# Patient Record
Sex: MALE | Race: WHITE | NOT HISPANIC OR LATINO | URBAN - METROPOLITAN AREA
[De-identification: names, ages, dates, MRNs, and addresses within clinical notes are randomized per-mention and may not be internally consistent; named-entity substitution may affect disease eponyms.]

---

## 2018-03-03 ENCOUNTER — INPATIENT (INPATIENT)
Facility: HOSPITAL | Age: 78
LOS: 5 days | Discharge: HOME | DRG: 220 | End: 2018-03-09
Attending: THORACIC SURGERY (CARDIOTHORACIC VASCULAR SURGERY) | Admitting: THORACIC SURGERY (CARDIOTHORACIC VASCULAR SURGERY)
Payer: MEDICARE

## 2018-03-03 ENCOUNTER — TRANSCRIPTION ENCOUNTER (OUTPATIENT)
Age: 78
End: 2018-03-03

## 2018-03-03 VITALS
SYSTOLIC BLOOD PRESSURE: 183 MMHG | TEMPERATURE: 98 F | WEIGHT: 154.98 LBS | HEIGHT: 67 IN | DIASTOLIC BLOOD PRESSURE: 105 MMHG | OXYGEN SATURATION: 99 % | HEART RATE: 83 BPM | RESPIRATION RATE: 18 BRPM

## 2018-03-03 DIAGNOSIS — I71.00 DISSECTION OF UNSPECIFIED SITE OF AORTA: ICD-10-CM

## 2018-03-03 DIAGNOSIS — I71.4 ABDOMINAL AORTIC ANEURYSM, WITHOUT RUPTURE: ICD-10-CM

## 2018-03-03 LAB
ALBUMIN SERPL ELPH-MCNC: 2.7 G/DL — LOW (ref 3.3–5)
ALBUMIN SERPL ELPH-MCNC: 3.4 G/DL — SIGNIFICANT CHANGE UP (ref 3.3–5)
ALP SERPL-CCNC: 42 U/L — SIGNIFICANT CHANGE UP (ref 40–120)
ALP SERPL-CCNC: 57 U/L — SIGNIFICANT CHANGE UP (ref 40–120)
ALT FLD-CCNC: 12 U/L RC — SIGNIFICANT CHANGE UP (ref 10–45)
ALT FLD-CCNC: 9 U/L RC — LOW (ref 10–45)
ANION GAP SERPL CALC-SCNC: 13 MMOL/L — SIGNIFICANT CHANGE UP (ref 5–17)
ANION GAP SERPL CALC-SCNC: 13 MMOL/L — SIGNIFICANT CHANGE UP (ref 5–17)
APTT BLD: 25.5 SEC — LOW (ref 27.5–37.4)
APTT BLD: 26.3 SEC — LOW (ref 27.5–37.4)
AST SERPL-CCNC: 15 U/L — SIGNIFICANT CHANGE UP (ref 10–40)
AST SERPL-CCNC: 17 U/L — SIGNIFICANT CHANGE UP (ref 10–40)
BILIRUB SERPL-MCNC: 0.4 MG/DL — SIGNIFICANT CHANGE UP (ref 0.2–1.2)
BILIRUB SERPL-MCNC: 0.6 MG/DL — SIGNIFICANT CHANGE UP (ref 0.2–1.2)
BLD GP AB SCN SERPL QL: NEGATIVE — SIGNIFICANT CHANGE UP
BUN SERPL-MCNC: 22 MG/DL — SIGNIFICANT CHANGE UP (ref 7–23)
BUN SERPL-MCNC: 23 MG/DL — SIGNIFICANT CHANGE UP (ref 7–23)
CALCIUM SERPL-MCNC: 7.5 MG/DL — LOW (ref 8.4–10.5)
CALCIUM SERPL-MCNC: 8.6 MG/DL — SIGNIFICANT CHANGE UP (ref 8.4–10.5)
CHLORIDE SERPL-SCNC: 102 MMOL/L — SIGNIFICANT CHANGE UP (ref 96–108)
CHLORIDE SERPL-SCNC: 103 MMOL/L — SIGNIFICANT CHANGE UP (ref 96–108)
CO2 SERPL-SCNC: 22 MMOL/L — SIGNIFICANT CHANGE UP (ref 22–31)
CO2 SERPL-SCNC: 24 MMOL/L — SIGNIFICANT CHANGE UP (ref 22–31)
CREAT SERPL-MCNC: 0.99 MG/DL — SIGNIFICANT CHANGE UP (ref 0.5–1.3)
CREAT SERPL-MCNC: 1.03 MG/DL — SIGNIFICANT CHANGE UP (ref 0.5–1.3)
GAS PNL BLDA: SIGNIFICANT CHANGE UP
GLUCOSE SERPL-MCNC: 113 MG/DL — HIGH (ref 70–99)
GLUCOSE SERPL-MCNC: 130 MG/DL — HIGH (ref 70–99)
HBA1C BLD-MCNC: 5.3 % — SIGNIFICANT CHANGE UP (ref 4–5.6)
HCT VFR BLD CALC: 29.3 % — LOW (ref 39–50)
HCT VFR BLD CALC: 32.7 % — LOW (ref 39–50)
HGB BLD-MCNC: 11.1 G/DL — LOW (ref 13–17)
HGB BLD-MCNC: 9.8 G/DL — LOW (ref 13–17)
INR BLD: 1.02 RATIO — SIGNIFICANT CHANGE UP (ref 0.88–1.16)
INR BLD: 1.11 RATIO — SIGNIFICANT CHANGE UP (ref 0.88–1.16)
MCHC RBC-ENTMCNC: 30.7 PG — SIGNIFICANT CHANGE UP (ref 27–34)
MCHC RBC-ENTMCNC: 31.4 PG — SIGNIFICANT CHANGE UP (ref 27–34)
MCHC RBC-ENTMCNC: 33.3 GM/DL — SIGNIFICANT CHANGE UP (ref 32–36)
MCHC RBC-ENTMCNC: 33.9 GM/DL — SIGNIFICANT CHANGE UP (ref 32–36)
MCV RBC AUTO: 92.3 FL — SIGNIFICANT CHANGE UP (ref 80–100)
MCV RBC AUTO: 92.7 FL — SIGNIFICANT CHANGE UP (ref 80–100)
PLATELET # BLD AUTO: 128 K/UL — LOW (ref 150–400)
PLATELET # BLD AUTO: 185 K/UL — SIGNIFICANT CHANGE UP (ref 150–400)
POTASSIUM SERPL-MCNC: 3.8 MMOL/L — SIGNIFICANT CHANGE UP (ref 3.5–5.3)
POTASSIUM SERPL-MCNC: 4.2 MMOL/L — SIGNIFICANT CHANGE UP (ref 3.5–5.3)
POTASSIUM SERPL-SCNC: 3.8 MMOL/L — SIGNIFICANT CHANGE UP (ref 3.5–5.3)
POTASSIUM SERPL-SCNC: 4.2 MMOL/L — SIGNIFICANT CHANGE UP (ref 3.5–5.3)
PROT SERPL-MCNC: 5 G/DL — LOW (ref 6–8.3)
PROT SERPL-MCNC: 6.5 G/DL — SIGNIFICANT CHANGE UP (ref 6–8.3)
PROTHROM AB SERPL-ACNC: 11 SEC — SIGNIFICANT CHANGE UP (ref 9.8–12.7)
PROTHROM AB SERPL-ACNC: 12 SEC — SIGNIFICANT CHANGE UP (ref 9.8–12.7)
RBC # BLD: 3.18 M/UL — LOW (ref 4.2–5.8)
RBC # BLD: 3.53 M/UL — LOW (ref 4.2–5.8)
RBC # FLD: 12.6 % — SIGNIFICANT CHANGE UP (ref 10.3–14.5)
RBC # FLD: 12.6 % — SIGNIFICANT CHANGE UP (ref 10.3–14.5)
RH IG SCN BLD-IMP: NEGATIVE — SIGNIFICANT CHANGE UP
SODIUM SERPL-SCNC: 137 MMOL/L — SIGNIFICANT CHANGE UP (ref 135–145)
SODIUM SERPL-SCNC: 140 MMOL/L — SIGNIFICANT CHANGE UP (ref 135–145)
T3 SERPL-MCNC: 86 NG/DL — SIGNIFICANT CHANGE UP (ref 80–200)
T4 AB SER-ACNC: 5.1 UG/DL — SIGNIFICANT CHANGE UP (ref 4.6–12)
TSH SERPL-MCNC: 2.44 UIU/ML — SIGNIFICANT CHANGE UP (ref 0.27–4.2)
WBC # BLD: 7.2 K/UL — SIGNIFICANT CHANGE UP (ref 3.8–10.5)
WBC # BLD: 8.2 K/UL — SIGNIFICANT CHANGE UP (ref 3.8–10.5)
WBC # FLD AUTO: 7.2 K/UL — SIGNIFICANT CHANGE UP (ref 3.8–10.5)
WBC # FLD AUTO: 8.2 K/UL — SIGNIFICANT CHANGE UP (ref 3.8–10.5)

## 2018-03-03 PROCEDURE — 75956: CPT | Mod: 26

## 2018-03-03 PROCEDURE — 71045 X-RAY EXAM CHEST 1 VIEW: CPT | Mod: 26

## 2018-03-03 PROCEDURE — 33880 EVASC RPR TA NDGFT COV LSA: CPT | Mod: 62

## 2018-03-03 PROCEDURE — 71275 CT ANGIOGRAPHY CHEST: CPT | Mod: 26

## 2018-03-03 PROCEDURE — 33880 EVASC RPR TA NDGFT COV LSA: CPT | Mod: 62,GC

## 2018-03-03 PROCEDURE — 75630 X-RAY AORTA LEG ARTERIES: CPT | Mod: 26

## 2018-03-03 PROCEDURE — 93010 ELECTROCARDIOGRAM REPORT: CPT

## 2018-03-03 PROCEDURE — 74174 CTA ABD&PLVS W/CONTRAST: CPT | Mod: 26

## 2018-03-03 RX ORDER — FENTANYL CITRATE 50 UG/ML
25 INJECTION INTRAVENOUS ONCE
Qty: 0 | Refills: 0 | Status: DISCONTINUED | OUTPATIENT
Start: 2018-03-03 | End: 2018-03-03

## 2018-03-03 RX ORDER — POTASSIUM CHLORIDE 20 MEQ
20 PACKET (EA) ORAL ONCE
Qty: 0 | Refills: 0 | Status: COMPLETED | OUTPATIENT
Start: 2018-03-03 | End: 2018-03-03

## 2018-03-03 RX ORDER — HYDRALAZINE HCL 50 MG
10 TABLET ORAL ONCE
Qty: 0 | Refills: 0 | Status: COMPLETED | OUTPATIENT
Start: 2018-03-03 | End: 2018-03-03

## 2018-03-03 RX ORDER — POTASSIUM CHLORIDE 20 MEQ
10 PACKET (EA) ORAL ONCE
Qty: 0 | Refills: 0 | Status: COMPLETED | OUTPATIENT
Start: 2018-03-03 | End: 2018-03-03

## 2018-03-03 RX ORDER — ACETAMINOPHEN 500 MG
1000 TABLET ORAL ONCE
Qty: 0 | Refills: 0 | Status: COMPLETED | OUTPATIENT
Start: 2018-03-03 | End: 2018-03-03

## 2018-03-03 RX ORDER — CEFUROXIME AXETIL 250 MG
1500 TABLET ORAL EVERY 8 HOURS
Qty: 0 | Refills: 0 | Status: COMPLETED | OUTPATIENT
Start: 2018-03-03 | End: 2018-03-04

## 2018-03-03 RX ORDER — POTASSIUM CHLORIDE 20 MEQ
10 PACKET (EA) ORAL
Qty: 0 | Refills: 0 | Status: COMPLETED | OUTPATIENT
Start: 2018-03-03 | End: 2018-03-03

## 2018-03-03 RX ORDER — PANTOPRAZOLE SODIUM 20 MG/1
40 TABLET, DELAYED RELEASE ORAL DAILY
Qty: 0 | Refills: 0 | Status: DISCONTINUED | OUTPATIENT
Start: 2018-03-03 | End: 2018-03-04

## 2018-03-03 RX ORDER — ESMOLOL HCL 100MG/10ML
50 VIAL (ML) INTRAVENOUS
Qty: 2500 | Refills: 0 | Status: DISCONTINUED | OUTPATIENT
Start: 2018-03-03 | End: 2018-03-04

## 2018-03-03 RX ORDER — ALBUMIN HUMAN 25 %
250 VIAL (ML) INTRAVENOUS ONCE
Qty: 0 | Refills: 0 | Status: COMPLETED | OUTPATIENT
Start: 2018-03-03 | End: 2018-03-03

## 2018-03-03 RX ORDER — NICARDIPINE HYDROCHLORIDE 30 MG/1
3 CAPSULE, EXTENDED RELEASE ORAL
Qty: 40 | Refills: 0 | Status: DISCONTINUED | OUTPATIENT
Start: 2018-03-03 | End: 2018-03-04

## 2018-03-03 RX ORDER — LABETALOL HCL 100 MG
1 TABLET ORAL
Qty: 200 | Refills: 0 | Status: DISCONTINUED | OUTPATIENT
Start: 2018-03-03 | End: 2018-03-03

## 2018-03-03 RX ORDER — TAMSULOSIN HYDROCHLORIDE 0.4 MG/1
0.4 CAPSULE ORAL AT BEDTIME
Qty: 0 | Refills: 0 | Status: DISCONTINUED | OUTPATIENT
Start: 2018-03-03 | End: 2018-03-09

## 2018-03-03 RX ORDER — LABETALOL HCL 100 MG
20 TABLET ORAL ONCE
Qty: 0 | Refills: 0 | Status: COMPLETED | OUTPATIENT
Start: 2018-03-03 | End: 2018-03-03

## 2018-03-03 RX ORDER — MORPHINE SULFATE 50 MG/1
4 CAPSULE, EXTENDED RELEASE ORAL ONCE
Qty: 0 | Refills: 0 | Status: DISCONTINUED | OUTPATIENT
Start: 2018-03-03 | End: 2018-03-03

## 2018-03-03 RX ORDER — FENTANYL CITRATE 50 UG/ML
50 INJECTION INTRAVENOUS ONCE
Qty: 0 | Refills: 0 | Status: DISCONTINUED | OUTPATIENT
Start: 2018-03-03 | End: 2018-03-03

## 2018-03-03 RX ORDER — HYDROMORPHONE HYDROCHLORIDE 2 MG/ML
0.5 INJECTION INTRAMUSCULAR; INTRAVENOUS; SUBCUTANEOUS ONCE
Qty: 0 | Refills: 0 | Status: DISCONTINUED | OUTPATIENT
Start: 2018-03-03 | End: 2018-03-03

## 2018-03-03 RX ORDER — METOPROLOL TARTRATE 50 MG
25 TABLET ORAL EVERY 12 HOURS
Qty: 0 | Refills: 0 | Status: DISCONTINUED | OUTPATIENT
Start: 2018-03-04 | End: 2018-03-04

## 2018-03-03 RX ORDER — PROPOFOL 10 MG/ML
40 INJECTION, EMULSION INTRAVENOUS
Qty: 1000 | Refills: 0 | Status: DISCONTINUED | OUTPATIENT
Start: 2018-03-03 | End: 2018-03-03

## 2018-03-03 RX ADMIN — Medication 100 MILLIGRAM(S): at 18:06

## 2018-03-03 RX ADMIN — Medication 50 MILLIEQUIVALENT(S): at 18:06

## 2018-03-03 RX ADMIN — Medication 400 MILLIGRAM(S): at 16:50

## 2018-03-03 RX ADMIN — Medication 1000 MILLIGRAM(S): at 17:05

## 2018-03-03 RX ADMIN — Medication 50 MILLIEQUIVALENT(S): at 13:49

## 2018-03-03 RX ADMIN — HYDROMORPHONE HYDROCHLORIDE 0.5 MILLIGRAM(S): 2 INJECTION INTRAMUSCULAR; INTRAVENOUS; SUBCUTANEOUS at 13:50

## 2018-03-03 RX ADMIN — FENTANYL CITRATE 50 MICROGRAM(S): 50 INJECTION INTRAVENOUS at 19:30

## 2018-03-03 RX ADMIN — FENTANYL CITRATE 25 MICROGRAM(S): 50 INJECTION INTRAVENOUS at 17:05

## 2018-03-03 RX ADMIN — MORPHINE SULFATE 4 MILLIGRAM(S): 50 CAPSULE, EXTENDED RELEASE ORAL at 09:00

## 2018-03-03 RX ADMIN — Medication 50 MILLIEQUIVALENT(S): at 19:27

## 2018-03-03 RX ADMIN — Medication 20 MILLIEQUIVALENT(S): at 22:31

## 2018-03-03 RX ADMIN — Medication 10 MILLIGRAM(S): at 13:10

## 2018-03-03 RX ADMIN — PANTOPRAZOLE SODIUM 40 MILLIGRAM(S): 20 TABLET, DELAYED RELEASE ORAL at 20:58

## 2018-03-03 RX ADMIN — NICARDIPINE HYDROCHLORIDE 15 MG/HR: 30 CAPSULE, EXTENDED RELEASE ORAL at 13:51

## 2018-03-03 RX ADMIN — FENTANYL CITRATE 50 MICROGRAM(S): 50 INJECTION INTRAVENOUS at 19:15

## 2018-03-03 RX ADMIN — Medication 20 MILLIGRAM(S): at 08:45

## 2018-03-03 RX ADMIN — NICARDIPINE HYDROCHLORIDE 15 MG/HR: 30 CAPSULE, EXTENDED RELEASE ORAL at 21:17

## 2018-03-03 RX ADMIN — Medication 125 MILLILITER(S): at 13:00

## 2018-03-03 RX ADMIN — Medication 21.09 MICROGRAM(S)/KG/MIN: at 21:17

## 2018-03-03 RX ADMIN — Medication 21.09 MICROGRAM(S)/KG/MIN: at 13:51

## 2018-03-03 RX ADMIN — MORPHINE SULFATE 4 MILLIGRAM(S): 50 CAPSULE, EXTENDED RELEASE ORAL at 08:45

## 2018-03-03 RX ADMIN — FENTANYL CITRATE 25 MICROGRAM(S): 50 INJECTION INTRAVENOUS at 16:50

## 2018-03-03 RX ADMIN — HYDROMORPHONE HYDROCHLORIDE 0.5 MILLIGRAM(S): 2 INJECTION INTRAMUSCULAR; INTRAVENOUS; SUBCUTANEOUS at 14:05

## 2018-03-03 NOTE — H&P ADULT - ASSESSMENT
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

## 2018-03-03 NOTE — BRIEF OPERATIVE NOTE - PROCEDURE
<<-----Click on this checkbox to enter Procedure Thoracostomy for drainage of hemothorax  03/03/2018    Active  [facility]  Second stage thoracic endovascular aortic repair (TEVAR)  03/03/2018    Active  [facility]

## 2018-03-03 NOTE — H&P ADULT - NSHPPHYSICALEXAM_GEN_ALL_CORE
Neuro: A+O X3, no deficets  Gen: Well nourished, anxious  Pulm: CTA B  Cardiac: S1/S2 RRR no murmurs or gallops or rubs noted  Abd: Soft, NT, ND  Ext, + Pedal pulses and radial pulses B    Physical exam was abbreviated due to emergent CTA/Surgical intervention.

## 2018-03-03 NOTE — PROGRESS NOTE ADULT - SUBJECTIVE AND OBJECTIVE BOX
I was  asked by Dr Valdez to see this  patient  transferred from [facility] with hemothorax on the  left  side  and  a Type  B  dissection  We could not  obtain  consent  from  the patient  because  on true  emergency nature  Family is  told  regarding  the  nature of his  condition and  the procedure  Patient  was brought to OR 23    immediately after  the  CTA  Patient is placed  supine  and  right groin  cut down done  and left  femoral puncture   and  5 Albanian sheath placed  Right  side  11 Albanian  sheath placed  after  making  sure  we are in the  aorta with  wire  IVUS used  to confirm  position of true  lumen  Aortic  arch angio done  TEVAR done  using 40 36 167  and  a distal 36 161  used   extravasation n stopped   Celiac  axis  flow preserved  True  lumen flow preserved  false lumen flow   almost all caty  There is  still abdominal   false lumen filling  Subclavian left partially covred Dr Dominguez  and myself  did  the procedure Dr Dominguez placed  a left chest tube

## 2018-03-03 NOTE — AIRWAY REMOVAL NOTE  ADULT & PEDS - ARTIFICAL AIRWAY REMOVAL COMMENTS
Written order for extubation verified. The patient was identified by full name and birth date compared to the identification band. Present during the procedure was Mónica LAMB)

## 2018-03-03 NOTE — H&P ADULT - PROBLEM SELECTOR PLAN 1
Admit to CTU Dr Valdez, Emergent CTA Chest, Abd, Pelvis for further evaluation of aortic dissection, Vascular surgery consultation with Dr Salas, Labetalol for HTN control, Morphine as well for intractable chest pain. NPO for Emergent Surgical intervention.

## 2018-03-03 NOTE — H&P ADULT - HISTORY OF PRESENT ILLNESS
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 Dissection extending into abdominal aorta.   Transferred to [facility] for CT surgery management with Dr. Valdez

## 2018-03-04 LAB
ALBUMIN SERPL ELPH-MCNC: 3.2 G/DL — LOW (ref 3.3–5)
ALP SERPL-CCNC: 47 U/L — SIGNIFICANT CHANGE UP (ref 40–120)
ALT FLD-CCNC: 9 U/L RC — LOW (ref 10–45)
AMYLASE P1 CFR SERPL: 68 U/L — SIGNIFICANT CHANGE UP (ref 25–125)
ANION GAP SERPL CALC-SCNC: 12 MMOL/L — SIGNIFICANT CHANGE UP (ref 5–17)
APTT BLD: 26.2 SEC — LOW (ref 27.5–37.4)
AST SERPL-CCNC: 20 U/L — SIGNIFICANT CHANGE UP (ref 10–40)
BILIRUB SERPL-MCNC: 0.7 MG/DL — SIGNIFICANT CHANGE UP (ref 0.2–1.2)
BUN SERPL-MCNC: 21 MG/DL — SIGNIFICANT CHANGE UP (ref 7–23)
CALCIUM SERPL-MCNC: 7.8 MG/DL — LOW (ref 8.4–10.5)
CHLORIDE SERPL-SCNC: 104 MMOL/L — SIGNIFICANT CHANGE UP (ref 96–108)
CK MB BLD-MCNC: 1.1 % — SIGNIFICANT CHANGE UP (ref 0–3.5)
CK MB CFR SERPL CALC: 2.6 NG/ML — SIGNIFICANT CHANGE UP (ref 0–6.7)
CK SERPL-CCNC: 242 U/L — HIGH (ref 30–200)
CO2 SERPL-SCNC: 22 MMOL/L — SIGNIFICANT CHANGE UP (ref 22–31)
CREAT SERPL-MCNC: 1.02 MG/DL — SIGNIFICANT CHANGE UP (ref 0.5–1.3)
GAS PNL BLDA: SIGNIFICANT CHANGE UP
GLUCOSE SERPL-MCNC: 150 MG/DL — HIGH (ref 70–99)
HCT VFR BLD CALC: 31.4 % — LOW (ref 39–50)
HGB BLD-MCNC: 10.7 G/DL — LOW (ref 13–17)
INR BLD: 1.03 RATIO — SIGNIFICANT CHANGE UP (ref 0.88–1.16)
LIDOCAIN IGE QN: 28 U/L — SIGNIFICANT CHANGE UP (ref 7–60)
MCHC RBC-ENTMCNC: 31.5 PG — SIGNIFICANT CHANGE UP (ref 27–34)
MCHC RBC-ENTMCNC: 33.9 GM/DL — SIGNIFICANT CHANGE UP (ref 32–36)
MCV RBC AUTO: 92.7 FL — SIGNIFICANT CHANGE UP (ref 80–100)
PHOSPHATE SERPL-MCNC: 3.1 MG/DL — SIGNIFICANT CHANGE UP (ref 2.5–4.5)
PLATELET # BLD AUTO: 145 K/UL — LOW (ref 150–400)
POTASSIUM SERPL-MCNC: 4.3 MMOL/L — SIGNIFICANT CHANGE UP (ref 3.5–5.3)
POTASSIUM SERPL-SCNC: 4.3 MMOL/L — SIGNIFICANT CHANGE UP (ref 3.5–5.3)
PROT SERPL-MCNC: 6 G/DL — SIGNIFICANT CHANGE UP (ref 6–8.3)
PROTHROM AB SERPL-ACNC: 11.1 SEC — SIGNIFICANT CHANGE UP (ref 9.8–12.7)
RBC # BLD: 3.39 M/UL — LOW (ref 4.2–5.8)
RBC # FLD: 12.8 % — SIGNIFICANT CHANGE UP (ref 10.3–14.5)
SODIUM SERPL-SCNC: 138 MMOL/L — SIGNIFICANT CHANGE UP (ref 135–145)
TROPONIN T SERPL-MCNC: <0.01 NG/ML — SIGNIFICANT CHANGE UP (ref 0–0.06)
WBC # BLD: 11.2 K/UL — HIGH (ref 3.8–10.5)
WBC # FLD AUTO: 11.2 K/UL — HIGH (ref 3.8–10.5)

## 2018-03-04 PROCEDURE — 71045 X-RAY EXAM CHEST 1 VIEW: CPT | Mod: 26

## 2018-03-04 PROCEDURE — 99233 SBSQ HOSP IP/OBS HIGH 50: CPT

## 2018-03-04 PROCEDURE — 93010 ELECTROCARDIOGRAM REPORT: CPT

## 2018-03-04 RX ORDER — LABETALOL HCL 100 MG
10 TABLET ORAL ONCE
Qty: 0 | Refills: 0 | Status: COMPLETED | OUTPATIENT
Start: 2018-03-04 | End: 2018-03-04

## 2018-03-04 RX ORDER — FUROSEMIDE 40 MG
20 TABLET ORAL ONCE
Qty: 0 | Refills: 0 | Status: COMPLETED | OUTPATIENT
Start: 2018-03-04 | End: 2018-03-04

## 2018-03-04 RX ORDER — ENOXAPARIN SODIUM 100 MG/ML
40 INJECTION SUBCUTANEOUS DAILY
Qty: 0 | Refills: 0 | Status: DISCONTINUED | OUTPATIENT
Start: 2018-03-04 | End: 2018-03-09

## 2018-03-04 RX ORDER — OXYCODONE AND ACETAMINOPHEN 5; 325 MG/1; MG/1
1 TABLET ORAL EVERY 6 HOURS
Qty: 0 | Refills: 0 | Status: DISCONTINUED | OUTPATIENT
Start: 2018-03-04 | End: 2018-03-09

## 2018-03-04 RX ORDER — METOPROLOL TARTRATE 50 MG
5 TABLET ORAL ONCE
Qty: 0 | Refills: 0 | Status: COMPLETED | OUTPATIENT
Start: 2018-03-04 | End: 2018-03-04

## 2018-03-04 RX ORDER — FENTANYL CITRATE 50 UG/ML
25 INJECTION INTRAVENOUS ONCE
Qty: 0 | Refills: 0 | Status: DISCONTINUED | OUTPATIENT
Start: 2018-03-04 | End: 2018-03-04

## 2018-03-04 RX ORDER — IPRATROPIUM/ALBUTEROL SULFATE 18-103MCG
3 AEROSOL WITH ADAPTER (GRAM) INHALATION EVERY 6 HOURS
Qty: 0 | Refills: 0 | Status: DISCONTINUED | OUTPATIENT
Start: 2018-03-04 | End: 2018-03-04

## 2018-03-04 RX ORDER — ACETAMINOPHEN 500 MG
1000 TABLET ORAL ONCE
Qty: 0 | Refills: 0 | Status: COMPLETED | OUTPATIENT
Start: 2018-03-04 | End: 2018-03-04

## 2018-03-04 RX ORDER — AMLODIPINE BESYLATE 2.5 MG/1
10 TABLET ORAL DAILY
Qty: 0 | Refills: 0 | Status: DISCONTINUED | OUTPATIENT
Start: 2018-03-04 | End: 2018-03-05

## 2018-03-04 RX ORDER — METOPROLOL TARTRATE 50 MG
50 TABLET ORAL EVERY 8 HOURS
Qty: 0 | Refills: 0 | Status: DISCONTINUED | OUTPATIENT
Start: 2018-03-04 | End: 2018-03-05

## 2018-03-04 RX ORDER — METOPROLOL TARTRATE 50 MG
25 TABLET ORAL EVERY 8 HOURS
Qty: 0 | Refills: 0 | Status: DISCONTINUED | OUTPATIENT
Start: 2018-03-04 | End: 2018-03-04

## 2018-03-04 RX ORDER — OXYCODONE AND ACETAMINOPHEN 5; 325 MG/1; MG/1
2 TABLET ORAL EVERY 6 HOURS
Qty: 0 | Refills: 0 | Status: DISCONTINUED | OUTPATIENT
Start: 2018-03-04 | End: 2018-03-09

## 2018-03-04 RX ORDER — ALBUMIN HUMAN 25 %
250 VIAL (ML) INTRAVENOUS ONCE
Qty: 0 | Refills: 0 | Status: COMPLETED | OUTPATIENT
Start: 2018-03-04 | End: 2018-03-04

## 2018-03-04 RX ORDER — IPRATROPIUM/ALBUTEROL SULFATE 18-103MCG
3 AEROSOL WITH ADAPTER (GRAM) INHALATION EVERY 6 HOURS
Qty: 0 | Refills: 0 | Status: DISCONTINUED | OUTPATIENT
Start: 2018-03-04 | End: 2018-03-09

## 2018-03-04 RX ORDER — ASPIRIN/CALCIUM CARB/MAGNESIUM 324 MG
81 TABLET ORAL DAILY
Qty: 0 | Refills: 0 | Status: DISCONTINUED | OUTPATIENT
Start: 2018-03-04 | End: 2018-03-09

## 2018-03-04 RX ORDER — PANTOPRAZOLE SODIUM 20 MG/1
40 TABLET, DELAYED RELEASE ORAL
Qty: 0 | Refills: 0 | Status: DISCONTINUED | OUTPATIENT
Start: 2018-03-04 | End: 2018-03-09

## 2018-03-04 RX ADMIN — FENTANYL CITRATE 25 MICROGRAM(S): 50 INJECTION INTRAVENOUS at 05:45

## 2018-03-04 RX ADMIN — OXYCODONE AND ACETAMINOPHEN 2 TABLET(S): 5; 325 TABLET ORAL at 14:53

## 2018-03-04 RX ADMIN — Medication 5 MILLIGRAM(S): at 22:02

## 2018-03-04 RX ADMIN — FENTANYL CITRATE 25 MICROGRAM(S): 50 INJECTION INTRAVENOUS at 05:14

## 2018-03-04 RX ADMIN — Medication 20 MILLIGRAM(S): at 17:01

## 2018-03-04 RX ADMIN — Medication 20 MILLIGRAM(S): at 12:05

## 2018-03-04 RX ADMIN — Medication 1000 MILLIGRAM(S): at 06:50

## 2018-03-04 RX ADMIN — ENOXAPARIN SODIUM 40 MILLIGRAM(S): 100 INJECTION SUBCUTANEOUS at 12:05

## 2018-03-04 RX ADMIN — OXYCODONE AND ACETAMINOPHEN 2 TABLET(S): 5; 325 TABLET ORAL at 14:30

## 2018-03-04 RX ADMIN — TAMSULOSIN HYDROCHLORIDE 0.4 MILLIGRAM(S): 0.4 CAPSULE ORAL at 00:16

## 2018-03-04 RX ADMIN — FENTANYL CITRATE 25 MICROGRAM(S): 50 INJECTION INTRAVENOUS at 06:20

## 2018-03-04 RX ADMIN — TAMSULOSIN HYDROCHLORIDE 0.4 MILLIGRAM(S): 0.4 CAPSULE ORAL at 21:28

## 2018-03-04 RX ADMIN — PANTOPRAZOLE SODIUM 40 MILLIGRAM(S): 20 TABLET, DELAYED RELEASE ORAL at 08:10

## 2018-03-04 RX ADMIN — FENTANYL CITRATE 25 MICROGRAM(S): 50 INJECTION INTRAVENOUS at 06:35

## 2018-03-04 RX ADMIN — Medication 25 MILLIGRAM(S): at 05:14

## 2018-03-04 RX ADMIN — OXYCODONE AND ACETAMINOPHEN 2 TABLET(S): 5; 325 TABLET ORAL at 08:40

## 2018-03-04 RX ADMIN — Medication 400 MILLIGRAM(S): at 06:20

## 2018-03-04 RX ADMIN — FENTANYL CITRATE 25 MICROGRAM(S): 50 INJECTION INTRAVENOUS at 13:22

## 2018-03-04 RX ADMIN — FENTANYL CITRATE 25 MICROGRAM(S): 50 INJECTION INTRAVENOUS at 13:08

## 2018-03-04 RX ADMIN — Medication 10 MILLIGRAM(S): at 18:30

## 2018-03-04 RX ADMIN — Medication 50 MILLIGRAM(S): at 19:37

## 2018-03-04 RX ADMIN — OXYCODONE AND ACETAMINOPHEN 2 TABLET(S): 5; 325 TABLET ORAL at 08:10

## 2018-03-04 RX ADMIN — Medication 100 MILLIGRAM(S): at 02:00

## 2018-03-04 RX ADMIN — AMLODIPINE BESYLATE 10 MILLIGRAM(S): 2.5 TABLET ORAL at 22:04

## 2018-03-04 RX ADMIN — Medication 25 MILLIGRAM(S): at 13:08

## 2018-03-04 RX ADMIN — Medication 81 MILLIGRAM(S): at 14:29

## 2018-03-04 RX ADMIN — Medication 125 MILLILITER(S): at 03:19

## 2018-03-04 NOTE — PROVIDER CONTACT NOTE (OTHER) - ACTION/TREATMENT ORDERED:
MD WELLS AT BEDSIDE. MD WELLS STATES TO ONLY TAKE BLOOD PRESSURE ON RUE. DO NOT TAKE BLOOD PRESSURE ON LUE.

## 2018-03-04 NOTE — PROVIDER CONTACT NOTE (OTHER) - ASSESSMENT
EMERGENT TX FROM [facility] ON 3/3/18. S/P TEVAR ON 3/3/2018. MD WELLS AT BEDSIDE. REQUEST B/L BLOOD PRESSURE CUFF PRESSURES. LUE 77/61 (66). rue 96/55 (71). MD WELLS MADE AWARE IMMEDIATELY REGARDING BLOOD PRESSURE DIFFERENTIAL. MD WELLS AT BEDSIDE. MD WELLS STATES TO ONLY TAKE BLOOD PRESSURE ON RUE. DO NOT TAKE BLOOD PRESSURE ON LUE.

## 2018-03-04 NOTE — PROVIDER CONTACT NOTE (OTHER) - SITUATION
s/p TEVAR on 3/3/2018. MD WELLS AT BEDSIDE. REQUEST B/L BLOOD PRESSURE CUFF PRESSURES. PADMINI 77/61 (66). pippa 96/55 (71).

## 2018-03-04 NOTE — PROGRESS NOTE ADULT - SUBJECTIVE AND OBJECTIVE BOX
Operation: POD #1 s/p TEVAR  77 year old male, resting comfortably in bed, no acute distress    Vital Signs Last 24 Hrs  T(C): 36.8 (03 Mar 2018 23:00), Max: 36.9 (03 Mar 2018 19:00)  T(F): 98.3 (03 Mar 2018 23:00), Max: 98.5 (03 Mar 2018 19:00)  HR: 75 (04 Mar 2018 01:00) (49 - 83)  BP: 157/89 (03 Mar 2018 08:45) (157/89 - 194/99)  BP(mean): 116 (03 Mar 2018 08:45) (116 - 136)  RR: 28 (04 Mar 2018 01:00) (9 - 32)  SpO2: 100% (04 Mar 2018 01:00) (92% - 100%)    03-03 @ 07:01  -  03-04 @ 01:19  --------------------------------------------------------  IN:    esmolol Infusion: 359.2 mL    IV PiggyBack: 300 mL    niCARdipine Infusion: 225 mL    Oral Fluid: 240 mL  Total IN: 1124.2 mL    OUT:    Chest Tube: 220 mL    Chest Tube: 80 mL    Indwelling Catheter - Urethral: 1200 mL  Total OUT: 1500 mL    Total NET: -375.8 mL    LABS:             MEDICATIONS  (STANDING):  cefuroxime  IVPB 1500 milliGRAM(s) IV Intermittent every 8 hours  esMOLOL  Infusion 50 MICROgram(s)/kG/Min (21.09 mL/Hr) IV Continuous <Continuous>  metoprolol     tartrate 25 milliGRAM(s) Oral every 12 hours  niCARdipine Infusion 3 mG/Hr (15 mL/Hr) IV Continuous <Continuous>  pantoprazole  Injectable 40 milliGRAM(s) IV Push daily  tamsulosin 0.4 milliGRAM(s) Oral at bedtime    MEDICATIONS  (PRN):    PHYSCIAL EXAM:  General: A+Ox3, in NAD, follows commands, no focal deficits noted  Cardiovascular: S1, S2, RRR.   Lungs: Clear to ausculatation, no wheezing  Abdomen: Soft, Non-distended, non-tender,  positive BS  Extremeties: Warm, well perfused, palpable distal pulses, no edema    Incision:  clean, dry, intact. Groin incision clean, dry, intact.  Lines: RIJ intro, radial Ana Luisa, PIV  Drains: Santillan catheter. pleural chest tubes with sero-sang drainage, to low wall suction, no air leak.     RADIOLOGY & ADDITIONAL TESTS:    ADDITIONAL DATA:   Does the patient have a history of CHF?No  -If yes, systolic or diastolic  -Pre-operative EF: NL    Extubation within 24 hours? Yes    Does the patient have a history of kidney disease? No  -Give CKD stage if applicable:  -Patient's baseline Creatinine 0.99    Did the patient receive transfusion of blood and/or products? yes  -If yes, indication: Intraop acute blood loss    Joan-operative antibiotics discontinued within 48 hours of CTU admission?  -Name/date/time of discontinue: Cathy 3/4 @0200      Patient care discussed on morning interdisciplinary rounds with CTS team.   77y.o. Male s/p TEVAR,  hemodynamically stable, extubated on antihypertensives     Emergent type B dissection s/p TEVAR start ASA  Initiate beta-blocker and antihypertensives to titrate esmolol and cardene off. Keep MAPS 80-90  Prophylaxis: DVT-Heparin SQ, venodynes;   GI-Protonix  Pain management  Glucose control  Resume appropriate home medications. Operation: POD #1 s/p TEVAR  77 year old male, resting comfortably in bed, no acute distress    Vital Signs Last 24 Hrs  T(C): 36.8 (03 Mar 2018 23:00), Max: 36.9 (03 Mar 2018 19:00)  T(F): 98.3 (03 Mar 2018 23:00), Max: 98.5 (03 Mar 2018 19:00)  HR: 75 (04 Mar 2018 01:00) (49 - 83)  BP: 157/89 (03 Mar 2018 08:45) (157/89 - 194/99)  BP(mean): 116 (03 Mar 2018 08:45) (116 - 136)  RR: 28 (04 Mar 2018 01:00) (9 - 32)  SpO2: 100% (04 Mar 2018 01:00) (92% - 100%)    03-03 @ 07:01  -  03-04 @ 01:19  --------------------------------------------------------  IN:    esmolol Infusion: 359.2 mL    IV PiggyBack: 300 mL    niCARdipine Infusion: 225 mL    Oral Fluid: 240 mL  Total IN: 1124.2 mL    OUT:    Chest Tube: 220 mL    Chest Tube: 80 mL    Indwelling Catheter - Urethral: 1200 mL  Total OUT: 1500 mL    Total NET: -375.8 mL    LABS:                        10.7   11.2  )-----------( 145      ( 04 Mar 2018 01:44 )             31.4   03-04    138  |  104  |  21  ----------------------------<  150<H>  4.3   |  22  |  1.02    Ca    7.8<L>      04 Mar 2018 01:44  Phos  3.1     03-04    TPro  6.0  /  Alb  3.2<L>  /  TBili  0.7  /  DBili  x   /  AST  20  /  ALT  9<L>  /  AlkPhos  47  03-04    PT/INR - ( 04 Mar 2018 01:44 )   PT: 11.1 sec;   INR: 1.03 ratio         PTT - ( 04 Mar 2018 01:44 )  PTT:26.2 sec  ABG - ( 04 Mar 2018 03:17 )  pH: 7.43  /  pCO2: 36    /  pO2: 88    / HCO3: 24    / Base Excess: -.1   /  SaO2: 98                MEDICATIONS  (STANDING):  cefuroxime  IVPB 1500 milliGRAM(s) IV Intermittent every 8 hours  esMOLOL  Infusion 50 MICROgram(s)/kG/Min (21.09 mL/Hr) IV Continuous <Continuous>  metoprolol     tartrate 25 milliGRAM(s) Oral every 12 hours  niCARdipine Infusion 3 mG/Hr (15 mL/Hr) IV Continuous <Continuous>  pantoprazole  Injectable 40 milliGRAM(s) IV Push daily  tamsulosin 0.4 milliGRAM(s) Oral at bedtime    MEDICATIONS  (PRN):    PHYSCIAL EXAM:  General: A+Ox3, in NAD, follows commands, no focal deficits noted  Cardiovascular: S1, S2, RRR.   Lungs: Clear to ausculatation, no wheezing  Abdomen: Soft, Non-distended, non-tender,  positive BS  Extremeties: Warm, well perfused, palpable distal pulses, no edema    Incision:  clean, dry, intact. Groin incision clean, dry, intact.  Lines: RIJ intro, radial [place], PIV  Drains: Santillan catheter. pleural chest tubes with sero-sang drainage, to low wall suction, no air leak.     RADIOLOGY & ADDITIONAL TESTS:    ADDITIONAL DATA:   Does the patient have a history of CHF?No  -If yes, systolic or diastolic  -Pre-operative EF: NL    Extubation within 24 hours? Yes    Does the patient have a history of kidney disease? No  -Give CKD stage if applicable:  -Patient's baseline Creatinine 0.99    Did the patient receive transfusion of blood and/or products? yes  -If yes, indication: Intraop acute blood loss    Joan-operative antibiotics discontinued within 48 hours of CTU admission?  -Name/date/time of discontinue: Chalonacef 3/4 @0200      Patient care discussed on morning interdisciplinary rounds with CTS team.   77y.o. Male s/p TEVAR,  hemodynamically stable, extubated on antihypertensives     Emergent type B dissection s/p TEVAR start ASA  Initiate beta-blocker and antihypertensives to titrate esmolol and cardene off. Keep MAPS 80-90  Prophylaxis: DVT-Heparin SQ, venodynes;   GI-Protonix  Pain management  Glucose control  Resume appropriate home medications.

## 2018-03-04 NOTE — PROGRESS NOTE ADULT - SUBJECTIVE AND OBJECTIVE BOX
Post TEVAR  for  acute  Type B  dissection  with  rupture  and  hemothorax. Doing  well Minimal  chest tube  output   neurologically intact   BP under  control  Wounds  clean

## 2018-03-05 ENCOUNTER — TRANSCRIPTION ENCOUNTER (OUTPATIENT)
Age: 78
End: 2018-03-05

## 2018-03-05 LAB
ALBUMIN SERPL ELPH-MCNC: 3.4 G/DL — SIGNIFICANT CHANGE UP (ref 3.3–5)
ALP SERPL-CCNC: 48 U/L — SIGNIFICANT CHANGE UP (ref 40–120)
ALT FLD-CCNC: 9 U/L RC — LOW (ref 10–45)
ANION GAP SERPL CALC-SCNC: 11 MMOL/L — SIGNIFICANT CHANGE UP (ref 5–17)
AST SERPL-CCNC: 20 U/L — SIGNIFICANT CHANGE UP (ref 10–40)
BILIRUB SERPL-MCNC: 0.5 MG/DL — SIGNIFICANT CHANGE UP (ref 0.2–1.2)
BUN SERPL-MCNC: 28 MG/DL — HIGH (ref 7–23)
CALCIUM SERPL-MCNC: 8.2 MG/DL — LOW (ref 8.4–10.5)
CHLORIDE SERPL-SCNC: 101 MMOL/L — SIGNIFICANT CHANGE UP (ref 96–108)
CO2 SERPL-SCNC: 25 MMOL/L — SIGNIFICANT CHANGE UP (ref 22–31)
CREAT SERPL-MCNC: 1.3 MG/DL — SIGNIFICANT CHANGE UP (ref 0.5–1.3)
GLUCOSE SERPL-MCNC: 102 MG/DL — HIGH (ref 70–99)
HCT VFR BLD CALC: 29.7 % — LOW (ref 39–50)
HGB BLD-MCNC: 10 G/DL — LOW (ref 13–17)
MCHC RBC-ENTMCNC: 31.8 PG — SIGNIFICANT CHANGE UP (ref 27–34)
MCHC RBC-ENTMCNC: 33.8 GM/DL — SIGNIFICANT CHANGE UP (ref 32–36)
MCV RBC AUTO: 93.9 FL — SIGNIFICANT CHANGE UP (ref 80–100)
PHOSPHATE SERPL-MCNC: 2.7 MG/DL — SIGNIFICANT CHANGE UP (ref 2.5–4.5)
PLATELET # BLD AUTO: 137 K/UL — LOW (ref 150–400)
POTASSIUM SERPL-MCNC: 4.2 MMOL/L — SIGNIFICANT CHANGE UP (ref 3.5–5.3)
POTASSIUM SERPL-SCNC: 4.2 MMOL/L — SIGNIFICANT CHANGE UP (ref 3.5–5.3)
PROT SERPL-MCNC: 6.5 G/DL — SIGNIFICANT CHANGE UP (ref 6–8.3)
RBC # BLD: 3.16 M/UL — LOW (ref 4.2–5.8)
RBC # FLD: 12.9 % — SIGNIFICANT CHANGE UP (ref 10.3–14.5)
SODIUM SERPL-SCNC: 137 MMOL/L — SIGNIFICANT CHANGE UP (ref 135–145)
WBC # BLD: 11.4 K/UL — HIGH (ref 3.8–10.5)
WBC # FLD AUTO: 11.4 K/UL — HIGH (ref 3.8–10.5)

## 2018-03-05 PROCEDURE — 99233 SBSQ HOSP IP/OBS HIGH 50: CPT

## 2018-03-05 PROCEDURE — 93010 ELECTROCARDIOGRAM REPORT: CPT

## 2018-03-05 PROCEDURE — 71045 X-RAY EXAM CHEST 1 VIEW: CPT | Mod: 26,77

## 2018-03-05 PROCEDURE — 71045 X-RAY EXAM CHEST 1 VIEW: CPT | Mod: 26,76

## 2018-03-05 RX ORDER — HYDRALAZINE HCL 50 MG
5 TABLET ORAL ONCE
Qty: 0 | Refills: 0 | Status: COMPLETED | OUTPATIENT
Start: 2018-03-05 | End: 2018-03-05

## 2018-03-05 RX ORDER — BUDESONIDE, MICRONIZED 100 %
0.5 POWDER (GRAM) MISCELLANEOUS
Qty: 0 | Refills: 0 | Status: DISCONTINUED | OUTPATIENT
Start: 2018-03-05 | End: 2018-03-09

## 2018-03-05 RX ORDER — ZOLPIDEM TARTRATE 10 MG/1
5 TABLET ORAL AT BEDTIME
Qty: 0 | Refills: 0 | Status: DISCONTINUED | OUTPATIENT
Start: 2018-03-05 | End: 2018-03-09

## 2018-03-05 RX ORDER — LABETALOL HCL 100 MG
5 TABLET ORAL ONCE
Qty: 0 | Refills: 0 | Status: COMPLETED | OUTPATIENT
Start: 2018-03-05 | End: 2018-03-05

## 2018-03-05 RX ORDER — DOCUSATE SODIUM 100 MG
100 CAPSULE ORAL THREE TIMES A DAY
Qty: 0 | Refills: 0 | Status: DISCONTINUED | OUTPATIENT
Start: 2018-03-05 | End: 2018-03-09

## 2018-03-05 RX ORDER — METOPROLOL TARTRATE 50 MG
50 TABLET ORAL EVERY 8 HOURS
Qty: 0 | Refills: 0 | Status: DISCONTINUED | OUTPATIENT
Start: 2018-03-05 | End: 2018-03-05

## 2018-03-05 RX ORDER — VERAPAMIL HCL 240 MG
120 CAPSULE, EXTENDED RELEASE PELLETS 24 HR ORAL DAILY
Qty: 0 | Refills: 0 | Status: DISCONTINUED | OUTPATIENT
Start: 2018-03-05 | End: 2018-03-06

## 2018-03-05 RX ORDER — HYDRALAZINE HCL 50 MG
10 TABLET ORAL ONCE
Qty: 0 | Refills: 0 | Status: COMPLETED | OUTPATIENT
Start: 2018-03-05 | End: 2018-03-05

## 2018-03-05 RX ORDER — LABETALOL HCL 100 MG
200 TABLET ORAL EVERY 8 HOURS
Qty: 0 | Refills: 0 | Status: DISCONTINUED | OUTPATIENT
Start: 2018-03-05 | End: 2018-03-05

## 2018-03-05 RX ADMIN — Medication 120 MILLIGRAM(S): at 09:46

## 2018-03-05 RX ADMIN — Medication 20 MILLIGRAM(S): at 13:43

## 2018-03-05 RX ADMIN — ENOXAPARIN SODIUM 40 MILLIGRAM(S): 100 INJECTION SUBCUTANEOUS at 12:53

## 2018-03-05 RX ADMIN — ZOLPIDEM TARTRATE 5 MILLIGRAM(S): 10 TABLET ORAL at 23:29

## 2018-03-05 RX ADMIN — Medication 200 MILLIGRAM(S): at 01:37

## 2018-03-05 RX ADMIN — OXYCODONE AND ACETAMINOPHEN 2 TABLET(S): 5; 325 TABLET ORAL at 10:15

## 2018-03-05 RX ADMIN — Medication 5 MILLIGRAM(S): at 20:20

## 2018-03-05 RX ADMIN — Medication 3 MILLILITER(S): at 01:52

## 2018-03-05 RX ADMIN — Medication 10 MILLIGRAM(S): at 22:30

## 2018-03-05 RX ADMIN — Medication 0.5 MILLIGRAM(S): at 18:34

## 2018-03-05 RX ADMIN — Medication 100 MILLIGRAM(S): at 15:43

## 2018-03-05 RX ADMIN — PANTOPRAZOLE SODIUM 40 MILLIGRAM(S): 20 TABLET, DELAYED RELEASE ORAL at 08:28

## 2018-03-05 RX ADMIN — Medication 50 MILLIGRAM(S): at 05:28

## 2018-03-05 RX ADMIN — Medication 20 MILLIGRAM(S): at 08:57

## 2018-03-05 RX ADMIN — Medication 20 MILLIGRAM(S): at 01:58

## 2018-03-05 RX ADMIN — Medication 100 MILLIGRAM(S): at 21:04

## 2018-03-05 RX ADMIN — Medication 5 MILLIGRAM(S): at 01:36

## 2018-03-05 RX ADMIN — TAMSULOSIN HYDROCHLORIDE 0.4 MILLIGRAM(S): 0.4 CAPSULE ORAL at 21:04

## 2018-03-05 RX ADMIN — Medication 3 MILLILITER(S): at 09:23

## 2018-03-05 RX ADMIN — Medication 5 MILLIGRAM(S): at 21:30

## 2018-03-05 RX ADMIN — Medication 81 MILLIGRAM(S): at 12:53

## 2018-03-05 RX ADMIN — OXYCODONE AND ACETAMINOPHEN 2 TABLET(S): 5; 325 TABLET ORAL at 09:45

## 2018-03-05 NOTE — PROGRESS NOTE ADULT - SUBJECTIVE AND OBJECTIVE BOX
Operation: POD #1 s/p TEVAR  77 year old male, resting comfortably in bed, no acute distress    Vital Signs Last 24 Hrs  T(C): 36.8 (03 Mar 2018 23:00), Max: 36.9 (03 Mar 2018 19:00)  T(F): 98.3 (03 Mar 2018 23:00), Max: 98.5 (03 Mar 2018 19:00)  HR: 75 (04 Mar 2018 01:00) (49 - 83)  BP: 157/89 (03 Mar 2018 08:45) (157/89 - 194/99)  BP(mean): 116 (03 Mar 2018 08:45) (116 - 136)  RR: 28 (04 Mar 2018 01:00) (9 - 32)  SpO2: 100% (04 Mar 2018 01:00) (92% - 100%)    03-03 @ 07:01  -  03-04 @ 01:19  --------------------------------------------------------  IN:    esmolol Infusion: 359.2 mL    IV PiggyBack: 300 mL    niCARdipine Infusion: 225 mL    Oral Fluid: 240 mL  Total IN: 1124.2 mL    OUT:    Chest Tube: 220 mL    Chest Tube: 80 mL    Indwelling Catheter - Urethral: 1200 mL  Total OUT: 1500 mL    Total NET: -375.8 mL    LABS:                        10.7   11.2  )-----------( 145      ( 04 Mar 2018 01:44 )             31.4   03-04    138  |  104  |  21  ----------------------------<  150<H>  4.3   |  22  |  1.02    Ca    7.8<L>      04 Mar 2018 01:44  Phos  3.1     03-04    TPro  6.0  /  Alb  3.2<L>  /  TBili  0.7  /  DBili  x   /  AST  20  /  ALT  9<L>  /  AlkPhos  47  03-04    PT/INR - ( 04 Mar 2018 01:44 )   PT: 11.1 sec;   INR: 1.03 ratio         PTT - ( 04 Mar 2018 01:44 )  PTT:26.2 sec  ABG - ( 04 Mar 2018 03:17 )  pH: 7.43  /  pCO2: 36    /  pO2: 88    / HCO3: 24    / Base Excess: -.1   /  SaO2: 98                MEDICATIONS  (STANDING):  cefuroxime  IVPB 1500 milliGRAM(s) IV Intermittent every 8 hours  esMOLOL  Infusion 50 MICROgram(s)/kG/Min (21.09 mL/Hr) IV Continuous <Continuous>  metoprolol     tartrate 25 milliGRAM(s) Oral every 12 hours  niCARdipine Infusion 3 mG/Hr (15 mL/Hr) IV Continuous <Continuous>  pantoprazole  Injectable 40 milliGRAM(s) IV Push daily  tamsulosin 0.4 milliGRAM(s) Oral at bedtime    MEDICATIONS  (PRN):    PHYSCIAL EXAM:  General: A+Ox3, in NAD, follows commands, no focal deficits noted  Cardiovascular: S1, S2, RRR.   Lungs: Clear to ausculatation, no wheezing  Abdomen: Soft, Non-distended, non-tender,  positive BS  Extremeties: Warm, well perfused, palpable distal pulses, no edema    Incision:  clean, dry, intact. Groin incision clean, dry, intact.  Lines: RIJ intro, radial Ana Luisa, PIV  Drains: Santillan catheter. pleural chest tubes with sero-sang drainage, to low wall suction, no air leak.     RADIOLOGY & ADDITIONAL TESTS:    ADDITIONAL DATA:   Does the patient have a history of CHF?No  -If yes, systolic or diastolic  -Pre-operative EF: NL    Extubation within 24 hours? Yes    Does the patient have a history of kidney disease? No  -Give CKD stage if applicable:  -Patient's baseline Creatinine 0.99    Did the patient receive transfusion of blood and/or products? yes  -If yes, indication: Intraop acute blood loss    Joan-operative antibiotics discontinued within 48 hours of CTU admission?  -Name/date/time of discontinue: Chalonacef 3/4 @0200      Patient care discussed on morning interdisciplinary rounds with CTS team.   77y.o. Male s/p TEVAR,  hemodynamically stable, extubated on antihypertensives     Emergent type B dissection s/p TEVAR start ASA  Initiate beta-blocker and antihypertensives to titrate esmolol and cardene off. Keep MAPS 80-90  Prophylaxis: DVT-Heparin SQ, venodynes;   GI-Protonix  Pain management  Glucose control  Resume appropriate home medications.

## 2018-03-06 LAB
ALBUMIN SERPL ELPH-MCNC: 3.5 G/DL — SIGNIFICANT CHANGE UP (ref 3.3–5)
ALP SERPL-CCNC: 59 U/L — SIGNIFICANT CHANGE UP (ref 40–120)
ALT FLD-CCNC: 11 U/L RC — SIGNIFICANT CHANGE UP (ref 10–45)
ANION GAP SERPL CALC-SCNC: 13 MMOL/L — SIGNIFICANT CHANGE UP (ref 5–17)
AST SERPL-CCNC: 16 U/L — SIGNIFICANT CHANGE UP (ref 10–40)
BILIRUB SERPL-MCNC: 0.4 MG/DL — SIGNIFICANT CHANGE UP (ref 0.2–1.2)
BUN SERPL-MCNC: 31 MG/DL — HIGH (ref 7–23)
CALCIUM SERPL-MCNC: 8.8 MG/DL — SIGNIFICANT CHANGE UP (ref 8.4–10.5)
CHLORIDE SERPL-SCNC: 101 MMOL/L — SIGNIFICANT CHANGE UP (ref 96–108)
CO2 SERPL-SCNC: 25 MMOL/L — SIGNIFICANT CHANGE UP (ref 22–31)
CREAT SERPL-MCNC: 1 MG/DL — SIGNIFICANT CHANGE UP (ref 0.5–1.3)
GLUCOSE SERPL-MCNC: 155 MG/DL — HIGH (ref 70–99)
HCT VFR BLD CALC: 32.2 % — LOW (ref 39–50)
HGB BLD-MCNC: 10.7 G/DL — LOW (ref 13–17)
MCHC RBC-ENTMCNC: 31 PG — SIGNIFICANT CHANGE UP (ref 27–34)
MCHC RBC-ENTMCNC: 33.3 GM/DL — SIGNIFICANT CHANGE UP (ref 32–36)
MCV RBC AUTO: 93.3 FL — SIGNIFICANT CHANGE UP (ref 80–100)
PLATELET # BLD AUTO: 179 K/UL — SIGNIFICANT CHANGE UP (ref 150–400)
POTASSIUM SERPL-MCNC: 4 MMOL/L — SIGNIFICANT CHANGE UP (ref 3.5–5.3)
POTASSIUM SERPL-SCNC: 4 MMOL/L — SIGNIFICANT CHANGE UP (ref 3.5–5.3)
PROT SERPL-MCNC: 6.5 G/DL — SIGNIFICANT CHANGE UP (ref 6–8.3)
RBC # BLD: 3.45 M/UL — LOW (ref 4.2–5.8)
RBC # FLD: 12.8 % — SIGNIFICANT CHANGE UP (ref 10.3–14.5)
SODIUM SERPL-SCNC: 139 MMOL/L — SIGNIFICANT CHANGE UP (ref 135–145)
WBC # BLD: 13.4 K/UL — HIGH (ref 3.8–10.5)
WBC # FLD AUTO: 13.4 K/UL — HIGH (ref 3.8–10.5)

## 2018-03-06 PROCEDURE — 99233 SBSQ HOSP IP/OBS HIGH 50: CPT

## 2018-03-06 RX ORDER — FUROSEMIDE 40 MG
20 TABLET ORAL DAILY
Qty: 0 | Refills: 0 | Status: DISCONTINUED | OUTPATIENT
Start: 2018-03-06 | End: 2018-03-06

## 2018-03-06 RX ORDER — VERAPAMIL HCL 240 MG
180 CAPSULE, EXTENDED RELEASE PELLETS 24 HR ORAL DAILY
Qty: 0 | Refills: 0 | Status: DISCONTINUED | OUTPATIENT
Start: 2018-03-06 | End: 2018-03-08

## 2018-03-06 RX ORDER — FINASTERIDE 5 MG/1
5 TABLET, FILM COATED ORAL DAILY
Qty: 0 | Refills: 0 | Status: DISCONTINUED | OUTPATIENT
Start: 2018-03-06 | End: 2018-03-09

## 2018-03-06 RX ORDER — SODIUM CHLORIDE 9 MG/ML
3 INJECTION INTRAMUSCULAR; INTRAVENOUS; SUBCUTANEOUS EVERY 8 HOURS
Qty: 0 | Refills: 0 | Status: DISCONTINUED | OUTPATIENT
Start: 2018-03-06 | End: 2018-03-09

## 2018-03-06 RX ORDER — ROPINIROLE 8 MG/1
0.25 TABLET, FILM COATED, EXTENDED RELEASE ORAL THREE TIMES A DAY
Qty: 0 | Refills: 0 | Status: DISCONTINUED | OUTPATIENT
Start: 2018-03-06 | End: 2018-03-06

## 2018-03-06 RX ORDER — ROPINIROLE 8 MG/1
0.25 TABLET, FILM COATED, EXTENDED RELEASE ORAL THREE TIMES A DAY
Qty: 0 | Refills: 0 | Status: DISCONTINUED | OUTPATIENT
Start: 2018-03-06 | End: 2018-03-09

## 2018-03-06 RX ORDER — HYDRALAZINE HCL 50 MG
10 TABLET ORAL ONCE
Qty: 0 | Refills: 0 | Status: COMPLETED | OUTPATIENT
Start: 2018-03-06 | End: 2018-03-06

## 2018-03-06 RX ADMIN — ENOXAPARIN SODIUM 40 MILLIGRAM(S): 100 INJECTION SUBCUTANEOUS at 15:55

## 2018-03-06 RX ADMIN — Medication 81 MILLIGRAM(S): at 15:54

## 2018-03-06 RX ADMIN — ROPINIROLE 0.25 MILLIGRAM(S): 8 TABLET, FILM COATED, EXTENDED RELEASE ORAL at 15:55

## 2018-03-06 RX ADMIN — TAMSULOSIN HYDROCHLORIDE 0.4 MILLIGRAM(S): 0.4 CAPSULE ORAL at 22:00

## 2018-03-06 RX ADMIN — Medication 0.5 MILLIGRAM(S): at 19:32

## 2018-03-06 RX ADMIN — Medication 180 MILLIGRAM(S): at 04:25

## 2018-03-06 RX ADMIN — Medication 0.1 MILLIGRAM(S): at 22:00

## 2018-03-06 RX ADMIN — SODIUM CHLORIDE 3 MILLILITER(S): 9 INJECTION INTRAMUSCULAR; INTRAVENOUS; SUBCUTANEOUS at 14:06

## 2018-03-06 RX ADMIN — Medication 0.5 MILLIGRAM(S): at 05:33

## 2018-03-06 RX ADMIN — PANTOPRAZOLE SODIUM 40 MILLIGRAM(S): 20 TABLET, DELAYED RELEASE ORAL at 08:30

## 2018-03-06 RX ADMIN — Medication 100 MILLIGRAM(S): at 22:00

## 2018-03-06 RX ADMIN — ROPINIROLE 0.25 MILLIGRAM(S): 8 TABLET, FILM COATED, EXTENDED RELEASE ORAL at 22:00

## 2018-03-06 RX ADMIN — SODIUM CHLORIDE 3 MILLILITER(S): 9 INJECTION INTRAMUSCULAR; INTRAVENOUS; SUBCUTANEOUS at 21:16

## 2018-03-06 RX ADMIN — Medication 100 MILLIGRAM(S): at 15:54

## 2018-03-06 RX ADMIN — Medication 100 MILLIGRAM(S): at 05:13

## 2018-03-06 RX ADMIN — FINASTERIDE 5 MILLIGRAM(S): 5 TABLET, FILM COATED ORAL at 15:55

## 2018-03-06 RX ADMIN — Medication 10 MILLIGRAM(S): at 03:30

## 2018-03-06 RX ADMIN — Medication 0.1 MILLIGRAM(S): at 15:54

## 2018-03-06 RX ADMIN — Medication 0.1 MILLIGRAM(S): at 09:10

## 2018-03-06 RX ADMIN — Medication 20 MILLIGRAM(S): at 07:13

## 2018-03-06 NOTE — PROGRESS NOTE ADULT - PROBLEM SELECTOR PLAN 1
s/p TEVAR 3/3  C/W clonidine 0.1 q8 and verapamil.  Cough and deep breathe, Incentive Spirometry Q1h, Chest PT.  OOB to chair, Ambulate 4x daily as tolerated. s/p TEVAR 3/3  C/W clonidine 0.1 q8 and verapamil 180 daily.  Goal  -120  Cough and deep breathe, Incentive Spirometry Q1h, Chest PT.  OOB to chair, Ambulate 4x daily as tolerated.  Pending PT eval for disposition.

## 2018-03-06 NOTE — PROGRESS NOTE ADULT - SUBJECTIVE AND OBJECTIVE BOX
Operation: POD #3 s/p TEVAR  77 year old male, resting comfortably in bed, no acute distress    ICU Vital Signs Last 24 Hrs  T(C): 36.8 (06 Mar 2018 03:00), Max: 37 (05 Mar 2018 23:00)  T(F): 98.2 (06 Mar 2018 03:00), Max: 98.6 (05 Mar 2018 23:00)  HR: 87 (06 Mar 2018 07:00) (57 - 98)  BP: 129/65 (06 Mar 2018 07:00) (107/68 - 188/92)  BP(mean): 91 (06 Mar 2018 07:00) (83 - 123)                          10.7   13.4  )-----------( 179      ( 06 Mar 2018 01:12 )             32.2   03-06    139  |  101  |  31<H>  ----------------------------<  155<H>  4.0   |  25  |  1.00    Ca    8.8      06 Mar 2018 01:12  Phos  2.7     03-05    TPro  6.5  /  Alb  3.5  /  TBili  0.4  /  DBili  x   /  AST  16  /  ALT  11  /  AlkPhos  59  03-06     MEDICATIONS  (STANDING):  aspirin enteric coated 81 milliGRAM(s) Oral daily  buDESOnide   0.5 milliGRAM(s) Respule 0.5 milliGRAM(s) Inhalation two times a day  docusate sodium 100 milliGRAM(s) Oral three times a day  enoxaparin Injectable 40 milliGRAM(s) SubCutaneous daily  pantoprazole    Tablet 40 milliGRAM(s) Oral before breakfast  tamsulosin 0.4 milliGRAM(s) Oral at bedtime  verapamil  milliGRAM(s) Oral daily    MEDICATIONS  (PRN):  ALBUTerol/ipratropium for Nebulization 3 milliLiter(s) Nebulizer every 6 hours PRN Shortness of Breath and/or Wheezing  oxyCODONE    5 mG/acetaminophen 325 mG 1 Tablet(s) Oral every 6 hours PRN Mild Pain (1 - 3)  oxyCODONE    5 mG/acetaminophen 325 mG 2 Tablet(s) Oral every 6 hours PRN Moderate Pain (4 - 6)  zolpidem 5 milliGRAM(s) Oral at bedtime PRN Insomnia    PHYSCIAL EXAM:  General: A+Ox3, in NAD, follows commands, no focal deficits noted  Cardiovascular: S1, S2, RRR.   Lungs: Clear to ausculatation, no wheezing  Abdomen: Soft, Non-distended, non-tender,  positive BS  Extremeties: Warm, well perfused, palpable distal pulses, no edema    Incision:  clean, dry, intact. Groin incision clean, dry, intact.  Lines: RIJ intro, radial [place], PIV  Drains: Santillan catheter. pleural chest tubes with sero-sang drainage, to low wall suction, no air leak.     RADIOLOGY & ADDITIONAL TESTS:    ADDITIONAL DATA:   Does the patient have a history of CHF?No  -If yes, systolic or diastolic  -Pre-operative EF: NL    Extubation within 24 hours? Yes    Does the patient have a history of kidney disease? No  -Give CKD stage if applicable:  -Patient's baseline Creatinine 0.99    Did the patient receive transfusion of blood and/or products? yes  -If yes, indication: Intraop acute blood loss    Joan-operative antibiotics discontinued within 48 hours of CTU admission?  -Name/date/time of discontinue: Cathy 3/4 @0200      Patient care discussed on morning interdisciplinary rounds with CTS team.   77y.o. Male s/p TEVAR,  hemodynamically stable, extubated on antihypertensives     Emergent type B dissection s/p TEVAR start ASA  No further beta-blocker secondary to wheezing with increase in Verapamil with the addition of Clonidine to maintain MAPS 80-90  Prophylaxis: DVT-Lovenox SQ, venodynes;   GI-Protonix  Pain management  Glucose control  Resume appropriate home medications.

## 2018-03-06 NOTE — PROGRESS NOTE ADULT - ATTENDING COMMENTS
Patient was seen and evaluated on AM rounds.  Agree with above H&P and A&P.

## 2018-03-06 NOTE — PROGRESS NOTE ADULT - ASSESSMENT
78y/o M pmh HTN, anxiety, BPH, RLS transferred from 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  Post-op Course:  BB started for HTN but dc'd secondary to wheezing. 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  Post-op Course:  BB started for HTN but dc'd secondary to wheezing.   3/6 transferred to floor, VSS.

## 2018-03-06 NOTE — PROGRESS NOTE ADULT - SUBJECTIVE AND OBJECTIVE BOX
Subjective: Pt states "I'm ok" denies any CP, SOB, N/V and palpitations. No acute events overnight.     Telemetry:  SR 80s  Vital Signs Last 24 Hrs  T(F): 98.3 (18 @ 12:30), Max: 98.6 (18 @ 23:00)  HR: 82 (18 @ 12:30) (70 - 98)  BP: 109/65 (18 @ 12:30) (109/65 - 188/92)  RR: 18 (18 @ 12:30) (13 - 36)  SpO2: 95% (18 @ 12:30) (94% - 100%)             @ 07:01  -   @ 17:43  --------------------------------------------------------  IN: 240 mL / OUT: 0 mL / NET: 240 mL    Daily Weight in k.5 (06 Mar 2018 00:00)     PHYSICAL EXAM  Neurology: A&Ox3, nonfocal, no gross deficits  CV : RRR+S1S2  Sternal Wound: MSI CDI , Stable  Lungs: Respirations non-labored, B/L BS CTA  Abdomen: Soft, NT/ND, +BSx4Q, last BM   (-)N/V/D  : Voiding without difficulty  Extremities: B/L LE no edema, negative calf tenderness, +PP       MEDICATIONS  ALBUTerol/ipratropium for Nebulization 3 milliLiter(s) Nebulizer every 6 hours PRN  aspirin enteric coated 81 milliGRAM(s) Oral daily  buDESOnide   0.5 milliGRAM(s) Respule 0.5 milliGRAM(s) Inhalation two times a day  cloNIDine 0.1 milliGRAM(s) Oral every 8 hours  docusate sodium 100 milliGRAM(s) Oral three times a day  enoxaparin Injectable 40 milliGRAM(s) SubCutaneous daily  finasteride 5 milliGRAM(s) Oral daily  oxyCODONE    5 mG/acetaminophen 325 mG 1 Tablet(s) Oral every 6 hours PRN  oxyCODONE    5 mG/acetaminophen 325 mG 2 Tablet(s) Oral every 6 hours PRN  pantoprazole    Tablet 40 milliGRAM(s) Oral before breakfast  rOPINIRole 0.25 milliGRAM(s) Oral three times a day  sodium chloride 0.9% lock flush 3 milliLiter(s) IV Push every 8 hours  tamsulosin 0.4 milliGRAM(s) Oral at bedtime  verapamil  milliGRAM(s) Oral daily  zolpidem 5 milliGRAM(s) Oral at bedtime PRN      Physical Therapy Rec:   Home  [  ]   Home w/ PT  [  ]  Rehab  [  ]    Discussed with Cardiothoracic Team at AM rounds. Subjective: Pt states "I'm ok" denies any CP, SOB, N/V and palpitations. No acute events overnight.     Telemetry:  SR 80s  Vital Signs Last 24 Hrs  T(F): 98.3 (18 @ 12:30), Max: 98.6 (18 @ 23:00)  HR: 82 (18 @ 12:30) (70 - 98)  BP: 109/65 (18 @ 12:30) (109/65 - 188/92)  RR: 18 (18 @ 12:30) (13 - 36)  SpO2: 95% (18 @ 12:30) (94% - 100%)             @ 07:01  -   @ 17:43  --------------------------------------------------------  IN: 240 mL / OUT: 0 mL / NET: 240 mL    Daily Weight in k.5 (06 Mar 2018 00:00)     PHYSICAL EXAM  Neurology: A&Ox3, nonfocal, no gross deficits  CV : RRR+S1S2  Wound: Left lateral chest incision CDI with DSD, Stable  Lungs: Respirations non-labored, B/L BS CTA  Abdomen: Soft, NT/ND, +BSx4Q, last BM 3/5 (-)N/V/D  : Voiding without difficulty  Extremities: B/L LE no edema, negative calf tenderness, +PP, B/L groins with DSD CDI, no bleeding      MEDICATIONS  ALBUTerol/ipratropium for Nebulization 3 milliLiter(s) Nebulizer every 6 hours PRN  aspirin enteric coated 81 milliGRAM(s) Oral daily  buDESOnide   0.5 milliGRAM(s) Respule 0.5 milliGRAM(s) Inhalation two times a day  cloNIDine 0.1 milliGRAM(s) Oral every 8 hours  docusate sodium 100 milliGRAM(s) Oral three times a day  enoxaparin Injectable 40 milliGRAM(s) SubCutaneous daily  finasteride 5 milliGRAM(s) Oral daily  oxyCODONE    5 mG/acetaminophen 325 mG 1 Tablet(s) Oral every 6 hours PRN  oxyCODONE    5 mG/acetaminophen 325 mG 2 Tablet(s) Oral every 6 hours PRN  pantoprazole    Tablet 40 milliGRAM(s) Oral before breakfast  rOPINIRole 0.25 milliGRAM(s) Oral three times a day  sodium chloride 0.9% lock flush 3 milliLiter(s) IV Push every 8 hours  tamsulosin 0.4 milliGRAM(s) Oral at bedtime  verapamil  milliGRAM(s) Oral daily  zolpidem 5 milliGRAM(s) Oral at bedtime PRN      Physical Therapy Rec:   Home  [  ]   Home w/ PT  [  ]  Rehab  [  ]    Discussed with Cardiothoracic Team at AM rounds.

## 2018-03-07 ENCOUNTER — TRANSCRIPTION ENCOUNTER (OUTPATIENT)
Age: 78
End: 2018-03-07

## 2018-03-07 LAB
ANION GAP SERPL CALC-SCNC: 12 MMOL/L — SIGNIFICANT CHANGE UP (ref 5–17)
BUN SERPL-MCNC: 38 MG/DL — HIGH (ref 7–23)
CALCIUM SERPL-MCNC: 8.7 MG/DL — SIGNIFICANT CHANGE UP (ref 8.4–10.5)
CHLORIDE SERPL-SCNC: 104 MMOL/L — SIGNIFICANT CHANGE UP (ref 96–108)
CO2 SERPL-SCNC: 23 MMOL/L — SIGNIFICANT CHANGE UP (ref 22–31)
CREAT SERPL-MCNC: 1.09 MG/DL — SIGNIFICANT CHANGE UP (ref 0.5–1.3)
GLUCOSE SERPL-MCNC: 100 MG/DL — HIGH (ref 70–99)
HCT VFR BLD CALC: 33.6 % — LOW (ref 39–50)
HGB BLD-MCNC: 10.8 G/DL — LOW (ref 13–17)
MCHC RBC-ENTMCNC: 30.4 PG — SIGNIFICANT CHANGE UP (ref 27–34)
MCHC RBC-ENTMCNC: 32.3 GM/DL — SIGNIFICANT CHANGE UP (ref 32–36)
MCV RBC AUTO: 94 FL — SIGNIFICANT CHANGE UP (ref 80–100)
NT-PROBNP SERPL-SCNC: 674 PG/ML — HIGH (ref 0–300)
PLATELET # BLD AUTO: 217 K/UL — SIGNIFICANT CHANGE UP (ref 150–400)
POTASSIUM SERPL-MCNC: 4.4 MMOL/L — SIGNIFICANT CHANGE UP (ref 3.5–5.3)
POTASSIUM SERPL-SCNC: 4.4 MMOL/L — SIGNIFICANT CHANGE UP (ref 3.5–5.3)
RBC # BLD: 3.57 M/UL — LOW (ref 4.2–5.8)
RBC # FLD: 12.8 % — SIGNIFICANT CHANGE UP (ref 10.3–14.5)
SODIUM SERPL-SCNC: 139 MMOL/L — SIGNIFICANT CHANGE UP (ref 135–145)
WBC # BLD: 9.2 K/UL — SIGNIFICANT CHANGE UP (ref 3.8–10.5)
WBC # FLD AUTO: 9.2 K/UL — SIGNIFICANT CHANGE UP (ref 3.8–10.5)

## 2018-03-07 RX ADMIN — Medication 81 MILLIGRAM(S): at 11:39

## 2018-03-07 RX ADMIN — Medication 100 MILLIGRAM(S): at 14:37

## 2018-03-07 RX ADMIN — ROPINIROLE 0.25 MILLIGRAM(S): 8 TABLET, FILM COATED, EXTENDED RELEASE ORAL at 21:31

## 2018-03-07 RX ADMIN — ROPINIROLE 0.25 MILLIGRAM(S): 8 TABLET, FILM COATED, EXTENDED RELEASE ORAL at 14:36

## 2018-03-07 RX ADMIN — PANTOPRAZOLE SODIUM 40 MILLIGRAM(S): 20 TABLET, DELAYED RELEASE ORAL at 05:59

## 2018-03-07 RX ADMIN — FINASTERIDE 5 MILLIGRAM(S): 5 TABLET, FILM COATED ORAL at 11:39

## 2018-03-07 RX ADMIN — ENOXAPARIN SODIUM 40 MILLIGRAM(S): 100 INJECTION SUBCUTANEOUS at 11:38

## 2018-03-07 RX ADMIN — Medication 0.5 MILLIGRAM(S): at 16:59

## 2018-03-07 RX ADMIN — Medication 0.1 MILLIGRAM(S): at 14:36

## 2018-03-07 RX ADMIN — SODIUM CHLORIDE 3 MILLILITER(S): 9 INJECTION INTRAMUSCULAR; INTRAVENOUS; SUBCUTANEOUS at 05:59

## 2018-03-07 RX ADMIN — Medication 0.1 MILLIGRAM(S): at 21:31

## 2018-03-07 RX ADMIN — SODIUM CHLORIDE 3 MILLILITER(S): 9 INJECTION INTRAMUSCULAR; INTRAVENOUS; SUBCUTANEOUS at 14:21

## 2018-03-07 RX ADMIN — Medication 100 MILLIGRAM(S): at 05:59

## 2018-03-07 RX ADMIN — Medication 0.1 MILLIGRAM(S): at 05:59

## 2018-03-07 RX ADMIN — ROPINIROLE 0.25 MILLIGRAM(S): 8 TABLET, FILM COATED, EXTENDED RELEASE ORAL at 05:58

## 2018-03-07 RX ADMIN — Medication 0.5 MILLIGRAM(S): at 05:59

## 2018-03-07 RX ADMIN — Medication 180 MILLIGRAM(S): at 05:59

## 2018-03-07 RX ADMIN — Medication 100 MILLIGRAM(S): at 21:31

## 2018-03-07 RX ADMIN — SODIUM CHLORIDE 3 MILLILITER(S): 9 INJECTION INTRAMUSCULAR; INTRAVENOUS; SUBCUTANEOUS at 21:29

## 2018-03-07 NOTE — DISCHARGE NOTE ADULT - HOME CARE AGENCY
[facility] [phone] for RN assessment and Physical Therapy evaluation [facility] [phone] for RN assessment and Physical Therapy evaluation with start of care day after discharge.

## 2018-03-07 NOTE — DISCHARGE NOTE ADULT - PLAN OF CARE
s/p TEVAR 1. Daily Shower  2. Weight yourself daily and notify any weight gain greater than 2-3 pounds in 24 hours.  3. Regular diet - low fat, low cholesterol, no added salt.  4. Cleanse groin incision daily while showering with warm water and mild soap, pat dry and maintain open to air. Allow for the steri strips to fall out on own.   5. Follow Cardiac Surgery Do's and Don'ts discharge instructions.   6. No driving until cleared by MD.   7. No heavy lifting nothing greater than 5 pounds until cleared by MD.   8. Call / Notify MD any fever greater than 101.0  9. Increase Activity as tolerated.

## 2018-03-07 NOTE — DISCHARGE NOTE ADULT - CARE PLAN
Principal Discharge DX:	Descending thoracic aortic aneurysm  Goal:	s/p TEVAR  Assessment and plan of treatment:	1. Daily Shower  2. Weight yourself daily and notify any weight gain greater than 2-3 pounds in 24 hours.  3. Regular diet - low fat, low cholesterol, no added salt.  4. Cleanse groin incision daily while showering with warm water and mild soap, pat dry and maintain open to air. Allow for the steri strips to fall out on own.   5. Follow Cardiac Surgery Do's and Don'ts discharge instructions.   6. No driving until cleared by MD.   7. No heavy lifting nothing greater than 5 pounds until cleared by MD.   8. Call / Notify MD any fever greater than 101.0  9. Increase Activity as tolerated.

## 2018-03-07 NOTE — DISCHARGE NOTE ADULT - MEDICATION SUMMARY - MEDICATIONS TO STOP TAKING
I will STOP taking the medications listed below when I get home from the hospital:    lisinopril 20 mg oral tablet  -- 1 tab(s) by mouth once a day    verapamil

## 2018-03-07 NOTE — DISCHARGE NOTE ADULT - PROVIDER TOKENS
TOKEN:'[id number]' TOKEN:'[id number]',FREE:[LAST:[Nena],FIRST:[Hung],PHONE:[phone]:[phone]],ADDRESS:[[place] [address]]]

## 2018-03-07 NOTE — PHYSICAL THERAPY INITIAL EVALUATION ADULT - PERTINENT HX OF CURRENT PROBLEM, REHAB EVAL
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

## 2018-03-07 NOTE — DISCHARGE NOTE ADULT - PATIENT PORTAL LINK FT
You can access the [facility], offered by [facility], by registering with the following website: [email or web address]

## 2018-03-07 NOTE — PROGRESS NOTE ADULT - PROBLEM SELECTOR PLAN 1
s/p TEVAR 3/3  C/W clonidine 0.1 q8 and verapamil 180 daily. Titrate verapamil if needed.   Goal  -120  Cough and deep breathe, Incentive Spirometry Q1h, Chest PT.  OOB to chair, Ambulate 4x daily as tolerated.  Pending PT eval for disposition  Chest x-ray in AM

## 2018-03-07 NOTE — PROGRESS NOTE ADULT - SUBJECTIVE AND OBJECTIVE BOX
VITAL SIGNS    Telemetry: SR 60-90   Vital Signs Last 24 Hrs  T(C): 37.2 (03-07-18 @ 12:09), Max: 37.2 (03-07-18 @ 12:09)  T(F): 99 (03-07-18 @ 12:09), Max: 99 (03-07-18 @ 12:09)  HR: 84 (03-07-18 @ 12:09) (79 - 84)  BP: 136/84 (03-07-18 @ 14:35) (132/74 - 136/84)  RR: 18 (03-07-18 @ 14:35) (18 - 18)  SpO2: 96% (03-07-18 @ 14:35) (63% - 98%)            03-06 @ 07:01  -  03-07 @ 07:00  --------------------------------------------------------  IN: 280 mL / OUT: 600 mL / NET: -320 mL    03-07 @ 07:01  -  03-07 @ 15:15  --------------------------------------------------------  IN: 600 mL / OUT: 250 mL / NET: 350 mL      Daily   Admit Wt: Drug Dosing Weight  Height (cm): 170.18 (03 Mar 2018 08:35)  Weight (kg): 70.3 (03 Mar 2018 08:35)  BMI (kg/m2): 24.3 (03 Mar 2018 08:35)  BSA (m2): 1.81 (03 Mar 2018 08:35)      CAPILLARY BLOOD GLUCOSE              MEDICATIONS  ALBUTerol/ipratropium for Nebulization 3 milliLiter(s) Nebulizer every 6 hours PRN  aspirin enteric coated 81 milliGRAM(s) Oral daily  buDESOnide   0.5 milliGRAM(s) Respule 0.5 milliGRAM(s) Inhalation two times a day  cloNIDine 0.1 milliGRAM(s) Oral every 8 hours  docusate sodium 100 milliGRAM(s) Oral three times a day  enoxaparin Injectable 40 milliGRAM(s) SubCutaneous daily  finasteride 5 milliGRAM(s) Oral daily  oxyCODONE    5 mG/acetaminophen 325 mG 1 Tablet(s) Oral every 6 hours PRN  oxyCODONE    5 mG/acetaminophen 325 mG 2 Tablet(s) Oral every 6 hours PRN  pantoprazole    Tablet 40 milliGRAM(s) Oral before breakfast  rOPINIRole 0.25 milliGRAM(s) Oral three times a day  sodium chloride 0.9% lock flush 3 milliLiter(s) IV Push every 8 hours  tamsulosin 0.4 milliGRAM(s) Oral at bedtime  verapamil  milliGRAM(s) Oral daily  zolpidem 5 milliGRAM(s) Oral at bedtime PRN      PHYSICAL EXAM  Subjective: Pt denies any chest pain, groin pain, SOB.   Neurology: alert and oriented x 3, nonfocal, no gross deficits  CV : s1, s2  Lungs: CTA b/l  Abdomen: soft, NT,ND, (+) Bowel sounds  :  voiding  Extremities: (-)erythema, edema, masses in groin sites b/l. No LE edema, erythema, cyanosis. No calve tenderness. Pedal pulses 2+ b/l      LABS  03-07    139  |  104  |  38<H>  ----------------------------<  100<H>  4.4   |  23  |  1.09    Ca    8.7      07 Mar 2018 05:58    TPro  6.5  /  Alb  3.5  /  TBili  0.4  /  DBili  x   /  AST  16  /  ALT  11  /  AlkPhos  59  03-06                                 10.8   9.2   )-----------( 217      ( 07 Mar 2018 05:58 )             33.6

## 2018-03-07 NOTE — PHYSICAL THERAPY INITIAL EVALUATION ADULT - ADDITIONAL COMMENTS
Pt lives w/ wife in a pvt house w/ 3 and 2 steps to enter. Pt has chair lift to access 2nd flr. PTA pt was indep w/o an assist device.

## 2018-03-07 NOTE — DISCHARGE NOTE ADULT - ADDITIONAL INSTRUCTIONS
1. Follow up with Dr. Valdez on Monday 3/19/18 at 2:00 pm for your post op follow up appointment, please call the [facility] office to schedule an appointment at [phone].   2. Please schedule an appointment with your PCP Dr. Barrett in 1-2 weeks, call the office to schedule an appointment.   3. Please schedule an appointment with your Cardiologist in 1-2 weeks, please call the office to schedule an appointment. You can schedule an appointment our [facility] cardiology Clinic 1st floor at [phone]

## 2018-03-07 NOTE — DISCHARGE NOTE ADULT - NS AS ACTIVITY OBS
Do not make important decisions/Do not drive or operate machinery/Walking-Outdoors allowed/Stairs allowed/No Heavy lifting/straining/Showering allowed/Walking-Indoors allowed

## 2018-03-07 NOTE — PROGRESS NOTE ADULT - ASSESSMENT
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  Post-op Course:  BB started for HTN but dc'd secondary to wheezing.   3/6 transferred to floor, VSS.  3/7 AVSS; OOB to chair, ambulating. Chest x-ray in AM. F/U PT evaluation for dispo planning.

## 2018-03-07 NOTE — DISCHARGE NOTE ADULT - CARE PROVIDERS DIRECT ADDRESSES
,[email or web address]

## 2018-03-07 NOTE — DISCHARGE NOTE ADULT - HOSPITAL COURSE
76 y/o Male with PMHx of  HTN, anxiety, BPH, RLS transferred from [facility] this AM with type B dissection of descending thoracic aorta beginning distal to L subclavian artery takeoff extending into abdominal aorta, L hemothorax adjacent to desc thoracic aorta and posterior mediastinal hematoma c/w leaking dissecting aneurysm now s/p 3/3 emergent TEVAR.  Post-op Course:  BB started for HTN but dc'd secondary to wheezing.   3/6 transferred to floor, VSS.  3/7 VSS; OOB to chair, ambulating. Chest x-ray in AM. F/U PT evaluation for disposition planning.  3/8 Hypertension --> Verapamil increased 240 mg PO daily. Continue on current medication. Increase activity. CXR PA/LA  3/9 Repeat Chest/ Abd /Pelvic CT finding: Status post TEVAR to the descending thoracic aorta. Continuation of the aortic dissection just distal to the TEVAR. Decreased  perfusion to the left kidney as the left renal artery arise from the false lumen. Finding reviewed by attending Dr. Valdez   Patient currently asymptomatic; VSS --> Medically cleared for discharge home today.

## 2018-03-07 NOTE — DISCHARGE NOTE ADULT - OTHER SIGNIFICANT FINDINGS
General: WN/WD NAD  Neurology: A&Ox3, nonfocal, PELAYO x 4  Head:  Normocephalic, atraumatic  ENT:  Mucosa moist, no ulcerations  Neck:  Supple, no sinuses or palpable masses  Lymphatic:  No palpable cervical, supraclavicular, axillary or inguinal adenopathy  Respiratory: CTA B/L  CV: RRR, S1S2, no murmur  Abdominal: Soft, NT, ND no palpable mass  MSK: No edema, + peripheral pulses, FROM all 4 extremity  Incisions: B/L groin sites C/D/I, no erythema or drainage

## 2018-03-08 LAB
ANION GAP SERPL CALC-SCNC: 11 MMOL/L — SIGNIFICANT CHANGE UP (ref 5–17)
BUN SERPL-MCNC: 29 MG/DL — HIGH (ref 7–23)
CALCIUM SERPL-MCNC: 8.7 MG/DL — SIGNIFICANT CHANGE UP (ref 8.4–10.5)
CHLORIDE SERPL-SCNC: 102 MMOL/L — SIGNIFICANT CHANGE UP (ref 96–108)
CO2 SERPL-SCNC: 24 MMOL/L — SIGNIFICANT CHANGE UP (ref 22–31)
CREAT SERPL-MCNC: 0.94 MG/DL — SIGNIFICANT CHANGE UP (ref 0.5–1.3)
GLUCOSE SERPL-MCNC: 79 MG/DL — SIGNIFICANT CHANGE UP (ref 70–99)
HCT VFR BLD CALC: 33 % — LOW (ref 39–50)
HGB BLD-MCNC: 11.2 G/DL — LOW (ref 13–17)
MCHC RBC-ENTMCNC: 31.8 PG — SIGNIFICANT CHANGE UP (ref 27–34)
MCHC RBC-ENTMCNC: 34 GM/DL — SIGNIFICANT CHANGE UP (ref 32–36)
MCV RBC AUTO: 93.5 FL — SIGNIFICANT CHANGE UP (ref 80–100)
PLATELET # BLD AUTO: 241 K/UL — SIGNIFICANT CHANGE UP (ref 150–400)
POTASSIUM SERPL-MCNC: 4.2 MMOL/L — SIGNIFICANT CHANGE UP (ref 3.5–5.3)
POTASSIUM SERPL-SCNC: 4.2 MMOL/L — SIGNIFICANT CHANGE UP (ref 3.5–5.3)
RBC # BLD: 3.53 M/UL — LOW (ref 4.2–5.8)
RBC # FLD: 12.8 % — SIGNIFICANT CHANGE UP (ref 10.3–14.5)
SODIUM SERPL-SCNC: 137 MMOL/L — SIGNIFICANT CHANGE UP (ref 135–145)
WBC # BLD: 9.5 K/UL — SIGNIFICANT CHANGE UP (ref 3.8–10.5)
WBC # FLD AUTO: 9.5 K/UL — SIGNIFICANT CHANGE UP (ref 3.8–10.5)

## 2018-03-08 PROCEDURE — 71046 X-RAY EXAM CHEST 2 VIEWS: CPT | Mod: 26

## 2018-03-08 RX ORDER — VERAPAMIL HCL 240 MG
120 CAPSULE, EXTENDED RELEASE PELLETS 24 HR ORAL ONCE
Qty: 0 | Refills: 0 | Status: COMPLETED | OUTPATIENT
Start: 2018-03-08 | End: 2018-03-08

## 2018-03-08 RX ORDER — VERAPAMIL HCL 240 MG
240 CAPSULE, EXTENDED RELEASE PELLETS 24 HR ORAL DAILY
Qty: 0 | Refills: 0 | Status: DISCONTINUED | OUTPATIENT
Start: 2018-03-09 | End: 2018-03-09

## 2018-03-08 RX ORDER — VERAPAMIL HCL 240 MG
240 CAPSULE, EXTENDED RELEASE PELLETS 24 HR ORAL DAILY
Qty: 0 | Refills: 0 | Status: DISCONTINUED | OUTPATIENT
Start: 2018-03-08 | End: 2018-03-08

## 2018-03-08 RX ORDER — VERAPAMIL HCL 240 MG
120 CAPSULE, EXTENDED RELEASE PELLETS 24 HR ORAL ONCE
Qty: 0 | Refills: 0 | Status: DISCONTINUED | OUTPATIENT
Start: 2018-03-08 | End: 2018-03-08

## 2018-03-08 RX ADMIN — Medication 81 MILLIGRAM(S): at 11:11

## 2018-03-08 RX ADMIN — FINASTERIDE 5 MILLIGRAM(S): 5 TABLET, FILM COATED ORAL at 11:11

## 2018-03-08 RX ADMIN — Medication 100 MILLIGRAM(S): at 13:09

## 2018-03-08 RX ADMIN — SODIUM CHLORIDE 3 MILLILITER(S): 9 INJECTION INTRAMUSCULAR; INTRAVENOUS; SUBCUTANEOUS at 05:15

## 2018-03-08 RX ADMIN — Medication 0.1 MILLIGRAM(S): at 13:09

## 2018-03-08 RX ADMIN — Medication 0.5 MILLIGRAM(S): at 05:17

## 2018-03-08 RX ADMIN — SODIUM CHLORIDE 3 MILLILITER(S): 9 INJECTION INTRAMUSCULAR; INTRAVENOUS; SUBCUTANEOUS at 21:01

## 2018-03-08 RX ADMIN — ROPINIROLE 0.25 MILLIGRAM(S): 8 TABLET, FILM COATED, EXTENDED RELEASE ORAL at 13:09

## 2018-03-08 RX ADMIN — Medication 0.1 MILLIGRAM(S): at 21:08

## 2018-03-08 RX ADMIN — ENOXAPARIN SODIUM 40 MILLIGRAM(S): 100 INJECTION SUBCUTANEOUS at 11:11

## 2018-03-08 RX ADMIN — ROPINIROLE 0.25 MILLIGRAM(S): 8 TABLET, FILM COATED, EXTENDED RELEASE ORAL at 05:15

## 2018-03-08 RX ADMIN — Medication 100 MILLIGRAM(S): at 05:15

## 2018-03-08 RX ADMIN — PANTOPRAZOLE SODIUM 40 MILLIGRAM(S): 20 TABLET, DELAYED RELEASE ORAL at 05:15

## 2018-03-08 RX ADMIN — OXYCODONE AND ACETAMINOPHEN 2 TABLET(S): 5; 325 TABLET ORAL at 22:59

## 2018-03-08 RX ADMIN — Medication 100 MILLIGRAM(S): at 21:09

## 2018-03-08 RX ADMIN — Medication 120 MILLIGRAM(S): at 13:08

## 2018-03-08 RX ADMIN — SODIUM CHLORIDE 3 MILLILITER(S): 9 INJECTION INTRAMUSCULAR; INTRAVENOUS; SUBCUTANEOUS at 13:09

## 2018-03-08 RX ADMIN — Medication 0.5 MILLIGRAM(S): at 17:25

## 2018-03-08 RX ADMIN — Medication 0.1 MILLIGRAM(S): at 05:15

## 2018-03-08 RX ADMIN — OXYCODONE AND ACETAMINOPHEN 2 TABLET(S): 5; 325 TABLET ORAL at 21:08

## 2018-03-08 RX ADMIN — Medication 180 MILLIGRAM(S): at 05:15

## 2018-03-08 NOTE — PROGRESS NOTE ADULT - SUBJECTIVE AND OBJECTIVE BOX
VITAL SIGNS    Subjective: Patient state: "Hello; I'm doing well" Denies CP, palpitation, SOB, PICKETT, fever or chills.  No acute events.    Telemetry:  NSR 70-90     Vital Signs Last 24 Hrs  T(C): 36.7 (18 @ 09:26), Max: 37.2 (18 @ 12:09)  T(F): 98.1 (18 @ 09:26), Max: 99 (18 @ 12:09)  HR: 75 (18 @ :26) (75 - 89)  BP: 130/78 (18 @ 09:26) (102/58 - 161/79)  RR: 18 (18:26) (17 - 18)  SpO2: 100% (18:26) (94% - 100%)            @ 07:01  -   @ 07:00  --------------------------------------------------------  IN: 960 mL / OUT: 250 mL / NET: 710 mL     @ 07:01  -   @ 11:32  --------------------------------------------------------  IN: 240 mL / OUT: 0 mL / NET: 240 mL    Daily     Daily Weight in k.3 (08 Mar 2018 09:26)    CAPILLARY BLOOD GLUCOSE                  PHYSICAL EXAM    Neurology: alert and oriented x 3, nonfocal, no gross deficits    CV: (+) S1 and S2, No murmurs, rubs, gallops or clicks     Lungs: CTA B/L     Abdomen: soft, nontender, nondistended, positive bowel sounds, (+) Flatus; (+) BM     :  Voiding               Extremities:  B/L LE (-) edema; negative calf tenderness; (+) 2 DP palpable; B/L groin C/D I         ALBUTerol/ipratropium for Nebulization 3 milliLiter(s) Nebulizer every 6 hours PRN  aspirin enteric coated 81 milliGRAM(s) Oral daily  buDESOnide  0.5 milliGRAM(s) Respule 0.5 milliGRAM(s) Inhalation two times a day  cloNIDine 0.1 milliGRAM(s) Oral every 8 hours  docusate sodium 100 milliGRAM(s) Oral three times a day  enoxaparin Injectable 40 milliGRAM(s) SubCutaneous daily  finasteride 5 milliGRAM(s) Oral daily  oxyCODONE 5 mG/acetaminophen 325 mG 1 Tablet(s) Oral every 6 hours PRN  oxyCODONE 5 mG/acetaminophen 325 mG 2 Tablet(s) Oral every 6 hours PRN  pantoprazole Tablet 40 milliGRAM(s) Oral before breakfast  rOPINIRole 0.25 milliGRAM(s) Oral three times a day  tamsulosin 0.4 milliGRAM(s) Oral at bedtime  verapamil  milliGRAM(s) Oral once  zolpidem 5 milliGRAM(s) Oral at bedtime PRN    Physical Therapy Rec:   Home  [  ]   Home w/ PT  [ X ]  Rehab  [  ]    Discussed with Cardiothoracic Team at AM rounds.

## 2018-03-08 NOTE — PROGRESS NOTE ADULT - ASSESSMENT
76 y/o Male with PMHx of  HTN, anxiety, BPH, RLS transferred from [facility] this AM with type B dissection of descending thoracic aorta beginning distal to L subclavian artery takeoff extending into abdominal aorta, L hemothorax adjacent to desc thoracic aorta and posterior mediastinal hematoma c/w leaking dissecting aneurysm now s/p 3/3 emergent TEVAR.  Post-op Course:  BB started for HTN but dc'd secondary to wheezing.   3/6 transferred to floor, VSS.  3/7 VSS; OOB to chair, ambulating. Chest x-ray in AM. F/U PT evaluation for disposition planning.  3/8 Hypertension --> Verapamil increased 240 mg PO daily. Continue on current medication. Increase activity. 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  Post-op Course:  BB started for HTN but dc'd secondary to wheezing.   3/6 transferred to floor, VSS.  3/7 VSS; OOB to chair, ambulating. Chest x-ray in AM. F/U PT evaluation for disposition planning.  3/8 Hypertension --> Verapamil increased 240 mg PO daily. Continue on current medication. Increase activity. CXR PA/LA   Disposition: Home PT once medically cleared

## 2018-03-08 NOTE — PROGRESS NOTE ADULT - PROBLEM SELECTOR PLAN 1
s/p TEVAR 3/3  Continue with clonidine 0.1 every 8 hours for BP control   Increase verapamil 240 mg PO daily    Goal  -120  Encourage Cough and deep breathe /  Incentive Spirometry Q1h, Chest PT.  Encourage OOB to chair  Ambulate 4x daily as tolerated.  Daily PT  Chest x-ray today   D/C Plan home PT onc medically cleared   Plan of care discussed with attending s/p TEVAR 3/3  Continue with clonidine 0.1 every 8 hours for BP control   Increase verapamil 240 mg PO daily    Goal  -120  Encourage Cough and deep breathe /  Incentive Spirometry Q1h, Chest PT.  Encourage OOB to chair  Ambulate 4x daily as tolerated.  Daily PT  Chest x-ray today   Continue with PUD and DVT prophylaxis   Continue with statin   D/C Plan home PT onc medically cleared   Plan of care discussed with attending

## 2018-03-09 VITALS
HEART RATE: 76 BPM | DIASTOLIC BLOOD PRESSURE: 76 MMHG | OXYGEN SATURATION: 96 % | RESPIRATION RATE: 16 BRPM | SYSTOLIC BLOOD PRESSURE: 122 MMHG | TEMPERATURE: 99 F

## 2018-03-09 PROBLEM — Z00.00 ENCOUNTER FOR PREVENTIVE HEALTH EXAMINATION: Status: ACTIVE | Noted: 2018-03-09

## 2018-03-09 PROBLEM — I10 ESSENTIAL (PRIMARY) HYPERTENSION: Chronic | Status: ACTIVE | Noted: 2018-03-03

## 2018-03-09 PROBLEM — N40.0 BENIGN PROSTATIC HYPERPLASIA WITHOUT LOWER URINARY TRACT SYMPTOMS: Chronic | Status: ACTIVE | Noted: 2018-03-03

## 2018-03-09 LAB
ANION GAP SERPL CALC-SCNC: 10 MMOL/L — SIGNIFICANT CHANGE UP (ref 5–17)
BUN SERPL-MCNC: 24 MG/DL — HIGH (ref 7–23)
CALCIUM SERPL-MCNC: 8.9 MG/DL — SIGNIFICANT CHANGE UP (ref 8.4–10.5)
CHLORIDE SERPL-SCNC: 102 MMOL/L — SIGNIFICANT CHANGE UP (ref 96–108)
CO2 SERPL-SCNC: 25 MMOL/L — SIGNIFICANT CHANGE UP (ref 22–31)
CREAT SERPL-MCNC: 0.97 MG/DL — SIGNIFICANT CHANGE UP (ref 0.5–1.3)
GLUCOSE SERPL-MCNC: 96 MG/DL — SIGNIFICANT CHANGE UP (ref 70–99)
HCT VFR BLD CALC: 35.6 % — LOW (ref 39–50)
HGB BLD-MCNC: 11.9 G/DL — LOW (ref 13–17)
MCHC RBC-ENTMCNC: 31.2 PG — SIGNIFICANT CHANGE UP (ref 27–34)
MCHC RBC-ENTMCNC: 33.5 GM/DL — SIGNIFICANT CHANGE UP (ref 32–36)
MCV RBC AUTO: 93.3 FL — SIGNIFICANT CHANGE UP (ref 80–100)
PLATELET # BLD AUTO: 260 K/UL — SIGNIFICANT CHANGE UP (ref 150–400)
POTASSIUM SERPL-MCNC: 4.4 MMOL/L — SIGNIFICANT CHANGE UP (ref 3.5–5.3)
POTASSIUM SERPL-SCNC: 4.4 MMOL/L — SIGNIFICANT CHANGE UP (ref 3.5–5.3)
RBC # BLD: 3.81 M/UL — LOW (ref 4.2–5.8)
RBC # FLD: 12.9 % — SIGNIFICANT CHANGE UP (ref 10.3–14.5)
SODIUM SERPL-SCNC: 137 MMOL/L — SIGNIFICANT CHANGE UP (ref 135–145)
WBC # BLD: 11.1 K/UL — HIGH (ref 3.8–10.5)
WBC # FLD AUTO: 11.1 K/UL — HIGH (ref 3.8–10.5)

## 2018-03-09 PROCEDURE — 84295 ASSAY OF SERUM SODIUM: CPT

## 2018-03-09 PROCEDURE — 85014 HEMATOCRIT: CPT

## 2018-03-09 PROCEDURE — 83690 ASSAY OF LIPASE: CPT

## 2018-03-09 PROCEDURE — P9047: CPT

## 2018-03-09 PROCEDURE — 84484 ASSAY OF TROPONIN QUANT: CPT

## 2018-03-09 PROCEDURE — 86900 BLOOD TYPING SEROLOGIC ABO: CPT

## 2018-03-09 PROCEDURE — 97116 GAIT TRAINING THERAPY: CPT

## 2018-03-09 PROCEDURE — C1760: CPT

## 2018-03-09 PROCEDURE — 74177 CT ABD & PELVIS W/CONTRAST: CPT | Mod: 26

## 2018-03-09 PROCEDURE — 71260 CT THORAX DX C+: CPT | Mod: 26

## 2018-03-09 PROCEDURE — 84436 ASSAY OF TOTAL THYROXINE: CPT

## 2018-03-09 PROCEDURE — 82330 ASSAY OF CALCIUM: CPT

## 2018-03-09 PROCEDURE — 93005 ELECTROCARDIOGRAM TRACING: CPT

## 2018-03-09 PROCEDURE — 80053 COMPREHEN METABOLIC PANEL: CPT

## 2018-03-09 PROCEDURE — 84443 ASSAY THYROID STIM HORMONE: CPT

## 2018-03-09 PROCEDURE — 84480 ASSAY TRIIODOTHYRONINE (T3): CPT

## 2018-03-09 PROCEDURE — 84132 ASSAY OF SERUM POTASSIUM: CPT

## 2018-03-09 PROCEDURE — 85610 PROTHROMBIN TIME: CPT

## 2018-03-09 PROCEDURE — 94640 AIRWAY INHALATION TREATMENT: CPT

## 2018-03-09 PROCEDURE — C1729: CPT

## 2018-03-09 PROCEDURE — 82947 ASSAY GLUCOSE BLOOD QUANT: CPT

## 2018-03-09 PROCEDURE — 82553 CREATINE MB FRACTION: CPT

## 2018-03-09 PROCEDURE — 85730 THROMBOPLASTIN TIME PARTIAL: CPT

## 2018-03-09 PROCEDURE — 85027 COMPLETE CBC AUTOMATED: CPT

## 2018-03-09 PROCEDURE — 36430 TRANSFUSION BLD/BLD COMPNT: CPT

## 2018-03-09 PROCEDURE — 74174 CTA ABD&PLVS W/CONTRAST: CPT

## 2018-03-09 PROCEDURE — 82150 ASSAY OF AMYLASE: CPT

## 2018-03-09 PROCEDURE — C1769: CPT

## 2018-03-09 PROCEDURE — 83605 ASSAY OF LACTIC ACID: CPT

## 2018-03-09 PROCEDURE — P9016: CPT

## 2018-03-09 PROCEDURE — 82435 ASSAY OF BLOOD CHLORIDE: CPT

## 2018-03-09 PROCEDURE — 71045 X-RAY EXAM CHEST 1 VIEW: CPT

## 2018-03-09 PROCEDURE — 83880 ASSAY OF NATRIURETIC PEPTIDE: CPT

## 2018-03-09 PROCEDURE — P9045: CPT

## 2018-03-09 PROCEDURE — 82803 BLOOD GASES ANY COMBINATION: CPT

## 2018-03-09 PROCEDURE — 71260 CT THORAX DX C+: CPT

## 2018-03-09 PROCEDURE — 83036 HEMOGLOBIN GLYCOSYLATED A1C: CPT

## 2018-03-09 PROCEDURE — 86901 BLOOD TYPING SEROLOGIC RH(D): CPT

## 2018-03-09 PROCEDURE — 86923 COMPATIBILITY TEST ELECTRIC: CPT

## 2018-03-09 PROCEDURE — 82550 ASSAY OF CK (CPK): CPT

## 2018-03-09 PROCEDURE — 71046 X-RAY EXAM CHEST 2 VIEWS: CPT

## 2018-03-09 PROCEDURE — C1753: CPT

## 2018-03-09 PROCEDURE — 84100 ASSAY OF PHOSPHORUS: CPT

## 2018-03-09 PROCEDURE — 80048 BASIC METABOLIC PNL TOTAL CA: CPT

## 2018-03-09 PROCEDURE — 86850 RBC ANTIBODY SCREEN: CPT

## 2018-03-09 PROCEDURE — 94002 VENT MGMT INPAT INIT DAY: CPT

## 2018-03-09 PROCEDURE — 97161 PT EVAL LOW COMPLEX 20 MIN: CPT

## 2018-03-09 PROCEDURE — C1887: CPT

## 2018-03-09 PROCEDURE — 71275 CT ANGIOGRAPHY CHEST: CPT

## 2018-03-09 PROCEDURE — C1874: CPT

## 2018-03-09 PROCEDURE — 76000 FLUOROSCOPY <1 HR PHYS/QHP: CPT

## 2018-03-09 PROCEDURE — 74177 CT ABD & PELVIS W/CONTRAST: CPT

## 2018-03-09 PROCEDURE — C1894: CPT

## 2018-03-09 RX ORDER — PANTOPRAZOLE SODIUM 20 MG/1
1 TABLET, DELAYED RELEASE ORAL
Qty: 30 | Refills: 0 | OUTPATIENT
Start: 2018-03-09 | End: 2018-04-07

## 2018-03-09 RX ORDER — VERAPAMIL HCL 240 MG
1 CAPSULE, EXTENDED RELEASE PELLETS 24 HR ORAL
Qty: 30 | Refills: 0 | OUTPATIENT
Start: 2018-03-09 | End: 2018-04-07

## 2018-03-09 RX ORDER — DOCUSATE SODIUM 100 MG
1 CAPSULE ORAL
Qty: 90 | Refills: 0 | OUTPATIENT
Start: 2018-03-09 | End: 2018-04-07

## 2018-03-09 RX ORDER — FINASTERIDE 5 MG/1
1 TABLET, FILM COATED ORAL
Qty: 0 | Refills: 0 | COMMUNITY

## 2018-03-09 RX ORDER — ASPIRIN/CALCIUM CARB/MAGNESIUM 324 MG
1 TABLET ORAL
Qty: 30 | Refills: 0 | OUTPATIENT
Start: 2018-03-09 | End: 2018-04-07

## 2018-03-09 RX ORDER — TAMSULOSIN HYDROCHLORIDE 0.4 MG/1
1 CAPSULE ORAL
Qty: 0 | Refills: 0 | COMMUNITY

## 2018-03-09 RX ORDER — LISINOPRIL 2.5 MG/1
1 TABLET ORAL
Qty: 0 | Refills: 0 | COMMUNITY

## 2018-03-09 RX ORDER — TAMSULOSIN HYDROCHLORIDE 0.4 MG/1
1 CAPSULE ORAL
Qty: 30 | Refills: 0 | OUTPATIENT
Start: 2018-03-09 | End: 2018-04-07

## 2018-03-09 RX ORDER — ROPINIROLE 8 MG/1
1 TABLET, FILM COATED, EXTENDED RELEASE ORAL
Qty: 0 | Refills: 0 | COMMUNITY

## 2018-03-09 RX ORDER — VERAPAMIL HCL 240 MG
0 CAPSULE, EXTENDED RELEASE PELLETS 24 HR ORAL
Qty: 0 | Refills: 0 | COMMUNITY

## 2018-03-09 RX ORDER — FINASTERIDE 5 MG/1
1 TABLET, FILM COATED ORAL
Qty: 30 | Refills: 0 | OUTPATIENT
Start: 2018-03-09 | End: 2018-04-07

## 2018-03-09 RX ORDER — BUDESONIDE, MICRONIZED 100 %
1 POWDER (GRAM) MISCELLANEOUS
Qty: 1 | Refills: 0 | OUTPATIENT
Start: 2018-03-09 | End: 2018-04-07

## 2018-03-09 RX ORDER — ALBUTEROL 90 UG/1
2 AEROSOL, METERED ORAL
Qty: 1 | Refills: 0 | OUTPATIENT
Start: 2018-03-09 | End: 2018-04-07

## 2018-03-09 RX ORDER — ROPINIROLE 8 MG/1
1 TABLET, FILM COATED, EXTENDED RELEASE ORAL
Qty: 90 | Refills: 0 | OUTPATIENT
Start: 2018-03-09 | End: 2018-04-07

## 2018-03-09 RX ADMIN — Medication 0.1 MILLIGRAM(S): at 05:15

## 2018-03-09 RX ADMIN — SODIUM CHLORIDE 3 MILLILITER(S): 9 INJECTION INTRAMUSCULAR; INTRAVENOUS; SUBCUTANEOUS at 05:25

## 2018-03-09 RX ADMIN — Medication 0.1 MILLIGRAM(S): at 14:01

## 2018-03-09 RX ADMIN — OXYCODONE AND ACETAMINOPHEN 1 TABLET(S): 5; 325 TABLET ORAL at 05:23

## 2018-03-09 RX ADMIN — ROPINIROLE 0.25 MILLIGRAM(S): 8 TABLET, FILM COATED, EXTENDED RELEASE ORAL at 05:15

## 2018-03-09 RX ADMIN — FINASTERIDE 5 MILLIGRAM(S): 5 TABLET, FILM COATED ORAL at 10:09

## 2018-03-09 RX ADMIN — Medication 0.5 MILLIGRAM(S): at 05:25

## 2018-03-09 RX ADMIN — PANTOPRAZOLE SODIUM 40 MILLIGRAM(S): 20 TABLET, DELAYED RELEASE ORAL at 05:14

## 2018-03-09 RX ADMIN — ENOXAPARIN SODIUM 40 MILLIGRAM(S): 100 INJECTION SUBCUTANEOUS at 10:10

## 2018-03-09 RX ADMIN — Medication 3 MILLILITER(S): at 05:14

## 2018-03-09 RX ADMIN — ROPINIROLE 0.25 MILLIGRAM(S): 8 TABLET, FILM COATED, EXTENDED RELEASE ORAL at 14:01

## 2018-03-09 RX ADMIN — Medication 240 MILLIGRAM(S): at 05:15

## 2018-03-09 RX ADMIN — OXYCODONE AND ACETAMINOPHEN 1 TABLET(S): 5; 325 TABLET ORAL at 06:03

## 2018-03-09 RX ADMIN — Medication 81 MILLIGRAM(S): at 10:09

## 2018-03-15 ENCOUNTER — OUTPATIENT (OUTPATIENT)
Dept: OUTPATIENT SERVICES | Facility: HOSPITAL | Age: 78
LOS: 1 days | End: 2018-03-15
Payer: MEDICARE

## 2018-03-15 ENCOUNTER — APPOINTMENT (OUTPATIENT)
Dept: CARDIOTHORACIC SURGERY | Facility: CLINIC | Age: 78
End: 2018-03-15
Payer: MEDICARE

## 2018-03-15 VITALS
DIASTOLIC BLOOD PRESSURE: 63 MMHG | SYSTOLIC BLOOD PRESSURE: 92 MMHG | BODY MASS INDEX: 25.11 KG/M2 | HEART RATE: 74 BPM | HEIGHT: 67 IN | RESPIRATION RATE: 13 BRPM | OXYGEN SATURATION: 97 % | WEIGHT: 160 LBS

## 2018-03-15 VITALS — SYSTOLIC BLOOD PRESSURE: 103 MMHG | DIASTOLIC BLOOD PRESSURE: 69 MMHG

## 2018-03-15 VITALS — DIASTOLIC BLOOD PRESSURE: 77 MMHG | SYSTOLIC BLOOD PRESSURE: 122 MMHG

## 2018-03-15 DIAGNOSIS — I10 ESSENTIAL (PRIMARY) HYPERTENSION: ICD-10-CM

## 2018-03-15 PROCEDURE — 99024 POSTOP FOLLOW-UP VISIT: CPT

## 2018-03-15 PROCEDURE — 71046 X-RAY EXAM CHEST 2 VIEWS: CPT

## 2018-03-15 PROCEDURE — 71046 X-RAY EXAM CHEST 2 VIEWS: CPT | Mod: 26

## 2018-03-15 RX ORDER — TAMSULOSIN HYDROCHLORIDE 0.4 MG/1
0.4 CAPSULE ORAL
Qty: 30 | Refills: 0 | Status: ACTIVE | COMMUNITY

## 2018-03-15 RX ORDER — BUDESONIDE 0.5 MG/2ML
0.5 INHALANT ORAL TWICE DAILY
Qty: 20 | Refills: 0 | Status: ACTIVE | COMMUNITY

## 2018-03-15 RX ORDER — ROPINIROLE 0.25 MG/1
0.25 TABLET, FILM COATED ORAL DAILY
Refills: 0 | Status: ACTIVE | COMMUNITY

## 2018-03-15 RX ORDER — PANTOPRAZOLE SODIUM 40 MG/1
40 TABLET, DELAYED RELEASE ORAL DAILY
Qty: 30 | Refills: 0 | Status: ACTIVE | COMMUNITY

## 2018-03-15 RX ORDER — ALBUTEROL SULFATE 90 UG/1
108 (90 BASE) AEROSOL, METERED RESPIRATORY (INHALATION)
Qty: 2 | Refills: 0 | Status: ACTIVE | COMMUNITY

## 2018-03-15 RX ORDER — VERAPAMIL HYDROCHLORIDE 240 MG/1
240 TABLET ORAL DAILY
Refills: 0 | Status: ACTIVE | COMMUNITY

## 2018-03-15 RX ORDER — FINASTERIDE 5 MG/1
5 TABLET, FILM COATED ORAL DAILY
Refills: 0 | Status: ACTIVE | COMMUNITY

## 2018-03-19 ENCOUNTER — APPOINTMENT (OUTPATIENT)
Dept: CARDIOTHORACIC SURGERY | Facility: CLINIC | Age: 78
End: 2018-03-19
Payer: MEDICARE

## 2018-03-19 VITALS
BODY MASS INDEX: 25.11 KG/M2 | SYSTOLIC BLOOD PRESSURE: 139 MMHG | HEART RATE: 77 BPM | RESPIRATION RATE: 13 BRPM | OXYGEN SATURATION: 96 % | TEMPERATURE: 98.8 F | DIASTOLIC BLOOD PRESSURE: 79 MMHG | WEIGHT: 160 LBS | HEIGHT: 67 IN

## 2018-03-19 DIAGNOSIS — I71.01 DISSECTION OF THORACIC AORTA: ICD-10-CM

## 2018-03-19 PROCEDURE — 99024 POSTOP FOLLOW-UP VISIT: CPT

## 2018-03-19 RX ORDER — CLONIDINE HYDROCHLORIDE 0.1 MG/1
0.1 TABLET ORAL 3 TIMES DAILY
Refills: 0 | Status: COMPLETED | COMMUNITY
End: 2018-03-19

## 2018-03-19 RX ORDER — OXYCODONE HYDROCHLORIDE AND ACETAMINOPHEN 5; 325 MG/1; MG/1
5-325 TABLET ORAL EVERY 6 HOURS
Refills: 0 | Status: COMPLETED | COMMUNITY
End: 2018-03-19

## 2018-03-28 DIAGNOSIS — Z98.890 OTHER SPECIFIED POSTPROCEDURAL STATES: ICD-10-CM

## 2018-03-28 DIAGNOSIS — Z86.79 OTHER SPECIFIED POSTPROCEDURAL STATES: ICD-10-CM

## 2018-04-13 ENCOUNTER — APPOINTMENT (OUTPATIENT)
Dept: CARDIOTHORACIC SURGERY | Facility: CLINIC | Age: 78
End: 2018-04-13